# Patient Record
Sex: FEMALE | Race: WHITE | Employment: OTHER | URBAN - METROPOLITAN AREA
[De-identification: names, ages, dates, MRNs, and addresses within clinical notes are randomized per-mention and may not be internally consistent; named-entity substitution may affect disease eponyms.]

---

## 2019-09-25 ENCOUNTER — HOSPITAL ENCOUNTER (OUTPATIENT)
Age: 64
Setting detail: OBSERVATION
Discharge: HOME | End: 2019-09-28
Attending: EMERGENCY MEDICINE | Admitting: INTERNAL MEDICINE
Payer: MEDICARE

## 2019-09-25 ENCOUNTER — APPOINTMENT (OUTPATIENT)
Dept: GENERAL RADIOLOGY | Age: 64
End: 2019-09-25
Attending: EMERGENCY MEDICINE
Payer: MEDICARE

## 2019-09-25 DIAGNOSIS — I48.91 NEW ONSET ATRIAL FIBRILLATION (HCC): Primary | ICD-10-CM

## 2019-09-25 DIAGNOSIS — I48.91 ATRIAL FIBRILLATION WITH RVR (HCC): ICD-10-CM

## 2019-09-25 DIAGNOSIS — R07.9 CHEST PAIN OF UNCERTAIN ETIOLOGY: ICD-10-CM

## 2019-09-25 PROBLEM — I10 HTN (HYPERTENSION): Status: ACTIVE | Noted: 2019-09-25

## 2019-09-25 PROBLEM — R10.11 RIGHT UPPER QUADRANT PAIN: Status: ACTIVE | Noted: 2019-09-25

## 2019-09-25 PROBLEM — I48.92 ATRIAL FLUTTER WITH RAPID VENTRICULAR RESPONSE (HCC): Status: ACTIVE | Noted: 2019-09-25

## 2019-09-25 PROBLEM — R13.10 DYSPHAGIA: Status: ACTIVE | Noted: 2019-09-25

## 2019-09-25 LAB
ALBUMIN SERPL-MCNC: 4 G/DL (ref 3.2–4.6)
ALBUMIN/GLOB SERPL: 1.1 {RATIO} (ref 1.2–3.5)
ALP SERPL-CCNC: 93 U/L (ref 50–136)
ALT SERPL-CCNC: 27 U/L (ref 12–65)
AMYLASE SERPL-CCNC: 57 U/L (ref 25–115)
ANION GAP SERPL CALC-SCNC: 8 MMOL/L (ref 7–16)
APTT PPP: 27.3 SEC (ref 24.7–39.8)
AST SERPL-CCNC: 23 U/L (ref 15–37)
ATRIAL RATE: 277 BPM
BASOPHILS # BLD: 0.1 K/UL (ref 0–0.2)
BASOPHILS NFR BLD: 1 % (ref 0–2)
BILIRUB SERPL-MCNC: 0.4 MG/DL (ref 0.2–1.1)
BNP SERPL-MCNC: 199 PG/ML
BUN SERPL-MCNC: 24 MG/DL (ref 8–23)
CALCIUM SERPL-MCNC: 9.3 MG/DL (ref 8.3–10.4)
CALCULATED R AXIS, ECG10: 40 DEGREES
CALCULATED T AXIS, ECG11: 86 DEGREES
CHLORIDE SERPL-SCNC: 105 MMOL/L (ref 98–107)
CO2 SERPL-SCNC: 29 MMOL/L (ref 21–32)
CREAT SERPL-MCNC: 1.12 MG/DL (ref 0.6–1)
DIAGNOSIS, 93000: NORMAL
DIFFERENTIAL METHOD BLD: ABNORMAL
EOSINOPHIL # BLD: 0.1 K/UL (ref 0–0.8)
EOSINOPHIL NFR BLD: 1 % (ref 0.5–7.8)
ERYTHROCYTE [DISTWIDTH] IN BLOOD BY AUTOMATED COUNT: 12.8 % (ref 11.9–14.6)
GLOBULIN SER CALC-MCNC: 3.8 G/DL (ref 2.3–3.5)
GLUCOSE SERPL-MCNC: 129 MG/DL (ref 65–100)
HCT VFR BLD AUTO: 44.5 % (ref 35.8–46.3)
HGB BLD-MCNC: 14.7 G/DL (ref 11.7–15.4)
IMM GRANULOCYTES # BLD AUTO: 0.1 K/UL (ref 0–0.5)
IMM GRANULOCYTES NFR BLD AUTO: 0 % (ref 0–5)
LIPASE SERPL-CCNC: 117 U/L (ref 73–393)
LYMPHOCYTES # BLD: 4.7 K/UL (ref 0.5–4.6)
LYMPHOCYTES NFR BLD: 42 % (ref 13–44)
MAGNESIUM SERPL-MCNC: 2.2 MG/DL (ref 1.8–2.4)
MCH RBC QN AUTO: 30.5 PG (ref 26.1–32.9)
MCHC RBC AUTO-ENTMCNC: 33 G/DL (ref 31.4–35)
MCV RBC AUTO: 92.3 FL (ref 79.6–97.8)
MONOCYTES # BLD: 0.9 K/UL (ref 0.1–1.3)
MONOCYTES NFR BLD: 8 % (ref 4–12)
NEUTS SEG # BLD: 5.3 K/UL (ref 1.7–8.2)
NEUTS SEG NFR BLD: 47 % (ref 43–78)
NRBC # BLD: 0 K/UL (ref 0–0.2)
PLATELET # BLD AUTO: 279 K/UL (ref 150–450)
PMV BLD AUTO: 9.4 FL (ref 9.4–12.3)
POTASSIUM SERPL-SCNC: 4.3 MMOL/L (ref 3.5–5.1)
PROT SERPL-MCNC: 7.8 G/DL (ref 6.3–8.2)
Q-T INTERVAL, ECG07: 290 MS
QRS DURATION, ECG06: 64 MS
QTC CALCULATION (BEZET), ECG08: 379 MS
RBC # BLD AUTO: 4.82 M/UL (ref 4.05–5.2)
SODIUM SERPL-SCNC: 142 MMOL/L (ref 136–145)
TROPONIN I BLD-MCNC: 0.01 NG/ML (ref 0.02–0.05)
TROPONIN I SERPL-MCNC: 0.02 NG/ML (ref 0.02–0.05)
TROPONIN I SERPL-MCNC: <0.02 NG/ML (ref 0.02–0.05)
TSH SERPL DL<=0.005 MIU/L-ACNC: 2.92 UIU/ML (ref 0.36–3.74)
VENTRICULAR RATE, ECG03: 103 BPM
WBC # BLD AUTO: 11.2 K/UL (ref 4.3–11.1)

## 2019-09-25 PROCEDURE — 71045 X-RAY EXAM CHEST 1 VIEW: CPT

## 2019-09-25 PROCEDURE — 80053 COMPREHEN METABOLIC PANEL: CPT

## 2019-09-25 PROCEDURE — 74011250636 HC RX REV CODE- 250/636: Performed by: NURSE PRACTITIONER

## 2019-09-25 PROCEDURE — 84443 ASSAY THYROID STIM HORMONE: CPT

## 2019-09-25 PROCEDURE — 96368 THER/DIAG CONCURRENT INF: CPT | Performed by: EMERGENCY MEDICINE

## 2019-09-25 PROCEDURE — 74011000250 HC RX REV CODE- 250: Performed by: EMERGENCY MEDICINE

## 2019-09-25 PROCEDURE — 74011000258 HC RX REV CODE- 258: Performed by: INTERNAL MEDICINE

## 2019-09-25 PROCEDURE — 36415 COLL VENOUS BLD VENIPUNCTURE: CPT

## 2019-09-25 PROCEDURE — 96366 THER/PROPH/DIAG IV INF ADDON: CPT

## 2019-09-25 PROCEDURE — 84484 ASSAY OF TROPONIN QUANT: CPT

## 2019-09-25 PROCEDURE — 99285 EMERGENCY DEPT VISIT HI MDM: CPT | Performed by: EMERGENCY MEDICINE

## 2019-09-25 PROCEDURE — 74011000250 HC RX REV CODE- 250: Performed by: INTERNAL MEDICINE

## 2019-09-25 PROCEDURE — 83735 ASSAY OF MAGNESIUM: CPT

## 2019-09-25 PROCEDURE — 74011250637 HC RX REV CODE- 250/637: Performed by: EMERGENCY MEDICINE

## 2019-09-25 PROCEDURE — 96367 TX/PROPH/DG ADDL SEQ IV INF: CPT | Performed by: EMERGENCY MEDICINE

## 2019-09-25 PROCEDURE — 85025 COMPLETE CBC W/AUTO DIFF WBC: CPT

## 2019-09-25 PROCEDURE — 96375 TX/PRO/DX INJ NEW DRUG ADDON: CPT | Performed by: EMERGENCY MEDICINE

## 2019-09-25 PROCEDURE — 74011250637 HC RX REV CODE- 250/637: Performed by: NURSE PRACTITIONER

## 2019-09-25 PROCEDURE — 83690 ASSAY OF LIPASE: CPT

## 2019-09-25 PROCEDURE — 83880 ASSAY OF NATRIURETIC PEPTIDE: CPT

## 2019-09-25 PROCEDURE — 74011250636 HC RX REV CODE- 250/636: Performed by: EMERGENCY MEDICINE

## 2019-09-25 PROCEDURE — 93005 ELECTROCARDIOGRAM TRACING: CPT | Performed by: EMERGENCY MEDICINE

## 2019-09-25 PROCEDURE — 85730 THROMBOPLASTIN TIME PARTIAL: CPT

## 2019-09-25 PROCEDURE — 99218 HC RM OBSERVATION: CPT

## 2019-09-25 PROCEDURE — 96365 THER/PROPH/DIAG IV INF INIT: CPT | Performed by: EMERGENCY MEDICINE

## 2019-09-25 PROCEDURE — 96366 THER/PROPH/DIAG IV INF ADDON: CPT | Performed by: EMERGENCY MEDICINE

## 2019-09-25 PROCEDURE — 82150 ASSAY OF AMYLASE: CPT

## 2019-09-25 RX ORDER — OXYCODONE AND ACETAMINOPHEN 10; 325 MG/1; MG/1
1 TABLET ORAL
Status: DISCONTINUED | OUTPATIENT
Start: 2019-09-25 | End: 2019-09-28 | Stop reason: HOSPADM

## 2019-09-25 RX ORDER — SIMVASTATIN 10 MG/1
10 TABLET, FILM COATED ORAL
Status: DISCONTINUED | OUTPATIENT
Start: 2019-09-25 | End: 2019-09-25

## 2019-09-25 RX ORDER — DILTIAZEM HYDROCHLORIDE 5 MG/ML
10 INJECTION INTRAVENOUS ONCE
Status: DISCONTINUED | OUTPATIENT
Start: 2019-09-25 | End: 2019-09-25

## 2019-09-25 RX ORDER — PANTOPRAZOLE SODIUM 40 MG/1
40 TABLET, DELAYED RELEASE ORAL 2 TIMES DAILY
Status: DISCONTINUED | OUTPATIENT
Start: 2019-09-25 | End: 2019-09-25

## 2019-09-25 RX ORDER — SIMVASTATIN 20 MG/1
40 TABLET, FILM COATED ORAL
Status: DISCONTINUED | OUTPATIENT
Start: 2019-09-26 | End: 2019-09-26

## 2019-09-25 RX ORDER — PANTOPRAZOLE SODIUM 40 MG/1
40 TABLET, DELAYED RELEASE ORAL 2 TIMES DAILY
Status: DISCONTINUED | OUTPATIENT
Start: 2019-09-26 | End: 2019-09-28 | Stop reason: HOSPADM

## 2019-09-25 RX ORDER — MAG HYDROX/ALUMINUM HYD/SIMETH 200-200-20
30 SUSPENSION, ORAL (FINAL DOSE FORM) ORAL
Status: COMPLETED | OUTPATIENT
Start: 2019-09-25 | End: 2019-09-25

## 2019-09-25 RX ORDER — LIDOCAINE HYDROCHLORIDE 20 MG/ML
15 SOLUTION OROPHARYNGEAL
Status: COMPLETED | OUTPATIENT
Start: 2019-09-25 | End: 2019-09-25

## 2019-09-25 RX ORDER — HEPARIN SODIUM 5000 [USP'U]/100ML
12-25 INJECTION, SOLUTION INTRAVENOUS
Status: DISCONTINUED | OUTPATIENT
Start: 2019-09-25 | End: 2019-09-27

## 2019-09-25 RX ORDER — ONDANSETRON 4 MG/1
4 TABLET, FILM COATED ORAL
COMMUNITY
End: 2020-02-14

## 2019-09-25 RX ORDER — OLMESARTAN MEDOXOMIL 40 MG/1
40 TABLET ORAL DAILY
Status: DISCONTINUED | OUTPATIENT
Start: 2019-09-26 | End: 2019-09-28 | Stop reason: HOSPADM

## 2019-09-25 RX ORDER — GUAIFENESIN 100 MG/5ML
81 LIQUID (ML) ORAL DAILY
Status: DISCONTINUED | OUTPATIENT
Start: 2019-09-26 | End: 2019-09-28 | Stop reason: HOSPADM

## 2019-09-25 RX ORDER — SODIUM CHLORIDE 0.9 % (FLUSH) 0.9 %
5-40 SYRINGE (ML) INJECTION AS NEEDED
Status: DISCONTINUED | OUTPATIENT
Start: 2019-09-25 | End: 2019-09-28 | Stop reason: HOSPADM

## 2019-09-25 RX ORDER — METAXALONE 800 MG/1
800 TABLET ORAL 3 TIMES DAILY
Status: DISCONTINUED | OUTPATIENT
Start: 2019-09-25 | End: 2019-09-25

## 2019-09-25 RX ORDER — SODIUM CHLORIDE 0.9 % (FLUSH) 0.9 %
5-40 SYRINGE (ML) INJECTION EVERY 8 HOURS
Status: DISCONTINUED | OUTPATIENT
Start: 2019-09-25 | End: 2019-09-28 | Stop reason: HOSPADM

## 2019-09-25 RX ORDER — HEPARIN SODIUM 5000 [USP'U]/ML
4000 INJECTION, SOLUTION INTRAVENOUS; SUBCUTANEOUS ONCE
Status: COMPLETED | OUTPATIENT
Start: 2019-09-25 | End: 2019-09-25

## 2019-09-25 RX ORDER — TORSEMIDE 10 MG/1
TABLET ORAL AS NEEDED
COMMUNITY

## 2019-09-25 RX ORDER — MORPHINE SULFATE 2 MG/ML
2 INJECTION, SOLUTION INTRAMUSCULAR; INTRAVENOUS
Status: DISCONTINUED | OUTPATIENT
Start: 2019-09-25 | End: 2019-09-28 | Stop reason: HOSPADM

## 2019-09-25 RX ORDER — DEXLANSOPRAZOLE 60 MG/1
60 CAPSULE, DELAYED RELEASE ORAL DAILY
Status: ON HOLD | COMMUNITY
End: 2019-09-28 | Stop reason: SDUPTHER

## 2019-09-25 RX ORDER — MORPHINE SULFATE 4 MG/ML
4 INJECTION INTRAVENOUS
Status: COMPLETED | OUTPATIENT
Start: 2019-09-25 | End: 2019-09-25

## 2019-09-25 RX ORDER — MAGNESIUM SULFATE HEPTAHYDRATE 40 MG/ML
2 INJECTION, SOLUTION INTRAVENOUS
Status: COMPLETED | OUTPATIENT
Start: 2019-09-25 | End: 2019-09-25

## 2019-09-25 RX ORDER — DILTIAZEM HYDROCHLORIDE 5 MG/ML
20 INJECTION INTRAVENOUS
Status: COMPLETED | OUTPATIENT
Start: 2019-09-25 | End: 2019-09-25

## 2019-09-25 RX ORDER — NITROGLYCERIN 0.4 MG/1
0.4 TABLET SUBLINGUAL
Status: DISCONTINUED | OUTPATIENT
Start: 2019-09-25 | End: 2019-09-25 | Stop reason: SDUPTHER

## 2019-09-25 RX ORDER — NITROGLYCERIN 0.4 MG/1
0.4 TABLET SUBLINGUAL
Status: DISCONTINUED | OUTPATIENT
Start: 2019-09-25 | End: 2019-09-28 | Stop reason: HOSPADM

## 2019-09-25 RX ADMIN — DILTIAZEM HYDROCHLORIDE 10 MG/HR: 5 INJECTION, SOLUTION INTRAVENOUS at 19:19

## 2019-09-25 RX ADMIN — LIDOCAINE HYDROCHLORIDE 15 ML: 20 SOLUTION ORAL; TOPICAL at 18:02

## 2019-09-25 RX ADMIN — ALUMINUM HYDROXIDE, MAGNESIUM HYDROXIDE, AND SIMETHICONE 30 ML: 200; 200; 20 SUSPENSION ORAL at 18:02

## 2019-09-25 RX ADMIN — MORPHINE SULFATE 4 MG: 4 INJECTION INTRAVENOUS at 17:43

## 2019-09-25 RX ADMIN — HEPARIN SODIUM 12 UNITS/KG/HR: 5000 INJECTION, SOLUTION INTRAVENOUS at 21:30

## 2019-09-25 RX ADMIN — MAGNESIUM SULFATE HEPTAHYDRATE 2 G: 40 INJECTION, SOLUTION INTRAVENOUS at 15:49

## 2019-09-25 RX ADMIN — SODIUM CHLORIDE 1000 ML: 900 INJECTION, SOLUTION INTRAVENOUS at 15:49

## 2019-09-25 RX ADMIN — NITROGLYCERIN 1 INCH: 20 OINTMENT TOPICAL at 22:47

## 2019-09-25 RX ADMIN — DILTIAZEM HYDROCHLORIDE 20 MG: 5 INJECTION INTRAVENOUS at 15:14

## 2019-09-25 RX ADMIN — OXYCODONE HYDROCHLORIDE AND ACETAMINOPHEN 1 TABLET: 10; 325 TABLET ORAL at 22:44

## 2019-09-25 RX ADMIN — HEPARIN SODIUM 4000 UNITS: 5000 INJECTION INTRAVENOUS; SUBCUTANEOUS at 21:13

## 2019-09-25 NOTE — ED PROVIDER NOTES
[facility] Emergency Department  Arrival Date/Time: No admission date for patient encounter. Nessa Burns  MRN: [id number]    YOB: 1955   59 y.o. female    [facility] EMERGENCY DEPT Room/bed info not found  Seen on 9/25/2019 @ 2:50 PM      Today's Chief Complaint: No chief complaint on file. TRIAGE Provider NOTE:  Pt with multiple c/o. R sided upper abd pain, into epigastric region, across back. RUQ pain for weeks, back and chest pain, for days. Tylenol and advil not helping. Has GI follow up for endoscopy/colonoscopy on Friday, referred to cards, cards told to come here. Feels like food and meds get stuck. C/o nausea, shob. No abd surgeries. Quit smoking now using e-cig. Hx of 2 stents. No NTG taken. No hx of afib. Afib with . No ST changes. Franko Montalvo MD; 9/25/2019 @2:50 PM============================     Nessa Burns is a 59 y.o. female seen on 9/25/2019 at 2:50 PM in the [facility] EMERGENCY DEPT   HPI:    HPI    Review of Systems: Review of Systems    Past Medical History: Primary Care Doctor: Denise Nash, Not On File  Meds, PMH, PSHx, SocHx at end of this note     Allergies: Allergies   Allergen Reactions    Erythromycin Nausea and Vomiting    Sulfa (Sulfonamide Antibiotics) Nausea and Vomiting         Key Anti-Platelet Anticoagulant Meds             aspirin (ASPIRIN) 325 mg tablet Take 325 mg by mouth daily. Physical Exam:  Nursing documentation reviewed. There were no vitals filed for this visit. Vital signs were reviewed. Physical Exam    MEDICAL DECISION MAKING:   Differential Diagnosis:    MDM      Data:      Lab findings during this visit: No results found for this or any previous visit (from the past 24 hour(s)).      Radiology studies during this visit:   No orders to display        Medications given in the ED: Medications - No data to display    Procedure Documentation: Procedures     Assessment and Plan:      Other ED Course Notes:        Past Medical History:      Past Medical History:   Diagnosis Date    Arthritis     CAD (coronary artery disease)     Cancer ([facility]     skin    GERD (gastroesophageal reflux disease)     Hypertension     Nausea & vomiting     Other ill-defined conditions(799.89)     high cholesterol; brain lesions, back pain    Stroke ([facility]     ? TIA     Past Surgical History:   Procedure Laterality Date    CARDIAC SURG PROCEDURE UNLIST      CATH - STENTS x2    HX ORTHOPAEDIC      knee surg     Social History     Tobacco Use    Smoking status: Current Every Day Smoker     Packs/day: 1.50    Tobacco comment: ecig only   Substance Use Topics    Alcohol use: No    Drug use: No     Home Medication:   Cannot display prior to admission medications because the patient has not been admitted in this contact.

## 2019-09-25 NOTE — ED NOTES
Jose IbarraValero St Altagracia Barrera is a 59 y.o. female seen on 9/25/2019 at 3:15 PM in the [facility] EMERGENCY DEPT in room ER10/10. Chief Complaint   Patient presents with    Chest Pain     HPI: 60-year-old female presenting to the emergency department for evaluation of chest pain. She states she has been having chest pain intermittently over the last 2 months. The pain is located in the epigastrium and radiating up into her the right side of her chest.  She is had epigastric pain for quite some time it will occasionally radiate into her chest.  Today however it began to radiate up into her chest and also she was feeling like she was having difficulty swallowing like things she was eating or hanging up high in her throat. She is scheduled for endoscopy and colonoscopy on Friday for evaluation of possible esophagitis and gastritis. She also recently had a right upper quadrant ultrasound to evaluate the symptoms further. She also feels like her heart is racing and it is beating irregularly. This is been going on intermittently over a couple of days but it seems more intense and persistent for the last couple of hours today. She is had some lightheadedness with standing. She denies diaphoresis, no nausea vomiting. She notes that her symptoms are worse when she is smoking electronic cigarettes. She has a history of myocardial infarction in the past.  At that time she had epigastric pain and right-sided chest pain as well but that that time she also states the pain settled in her right jaw and gumline and is well as pain in her right ear. Historian: Patient    REVIEW OF SYSTEMS     Review of Systems   Constitutional: Negative for fever. HENT: Negative. Eyes: Negative. Respiratory: Negative for cough, chest tightness, shortness of breath and wheezing. Cardiovascular: Positive for chest pain and palpitations.    Gastrointestinal: Negative for abdominal distention, abdominal pain, constipation, diarrhea and vomiting. Endocrine: Negative. Genitourinary: Negative for dysuria, flank pain, frequency and urgency. Neurological: Negative for dizziness, syncope and headaches. Psychiatric/Behavioral: Negative. All other systems reviewed and are negative. PAST MEDICAL HISTORY     Past Medical History:   Diagnosis Date    Arthritis     CAD (coronary artery disease)     Cancer ([facility]     skin    GERD (gastroesophageal reflux disease)     Hypertension     Nausea & vomiting     Other ill-defined conditions(799.89)     high cholesterol; brain lesions, back pain    Stroke ([facility]     ? TIA     Past Surgical History:   Procedure Laterality Date    CARDIAC SURG PROCEDURE UNLIST      CATH - STENTS x2    HX ORTHOPAEDIC      knee surg     Social History     Socioeconomic History    Marital status:      Spouse name: Not on file    Number of children: Not on file    Years of education: Not on file    Highest education level: Not on file   Tobacco Use    Smoking status: Current Every Day Smoker     Packs/day: 1.50    Tobacco comment: ecig only   Substance and Sexual Activity    Alcohol use: No    Drug use: No     Prior to Admission Medications   Prescriptions Last Dose Informant Patient Reported? Taking? Alpha Lipoic Acid 600 mg cap   Yes No   Sig: Take  by mouth daily. ascorbic acid (VITAMIN C) 500 mg tablet   Yes No   Sig: Take 500 mg by mouth daily. aspirin (ASPIRIN) 325 mg tablet   Yes No   Sig: Take 325 mg by mouth daily. bromelains 500 mg tab   Yes No   Sig: Take  by mouth daily. co-enzyme Q-10 (CO Q-10) 100 mg capsule   Yes No   Sig: Take 100 mg by mouth daily. hydroCHLOROthiazide (MICROZIDE) 12.5 mg capsule   Yes No   Sig: Take 12.5 mg by mouth daily. loratadine (CLARITIN) 10 mg tablet   Yes No   Sig: Take 10 mg by mouth daily. meloxicam (MOBIC) 7.5 mg tablet   Yes No   Sig: Take  by mouth daily.    metaxalone (SKELAXIN) 800 mg tablet   Yes No   Sig: Take  by mouth as needed. milk thistle 500 mg cap   Yes No   Sig: Take  by mouth daily. nitroglycerin (NITROSTAT) 0.4 mg SL tablet   No No   Si Tab by SubLINGual route every five (5) minutes as needed for Chest Pain. olmesartan (BENICAR) 40 mg tablet   Yes No   Sig: Take 40 mg by mouth daily. oxyCODONE (OXYIR) 5 mg capsule   Yes No   Sig: Take 10 mg by mouth every four (4) hours as needed. usu takes abt twice a day   pantoprazole (PROTONIX) 40 mg tablet   Yes No   Sig: Take 40 mg by mouth two (2) times a day. simvastatin (ZOCOR) 10 mg tablet   Yes No   Sig: Take 10 mg by mouth nightly. Facility-Administered Medications: None     Allergies   Allergen Reactions    Erythromycin Nausea and Vomiting    Sulfa (Sulfonamide Antibiotics) Nausea and Vomiting        PHYSICAL EXAM       Vitals:    19 1454 19 1514   BP: 114/58 158/72   Pulse: (!) 139 (!) 154   Resp: 16    Temp: 97.4 °F (36.3 °C)    SpO2: 96%     Vital signs were reviewed. Physical Exam   Constitutional: She is oriented to person, place, and time. She appears well-developed and well-nourished. No distress. HENT:   Head: Normocephalic and atraumatic. Eyes: Pupils are equal, round, and reactive to light. EOM are normal.   Neck: Normal range of motion. Neck supple. Cardiovascular: Normal heart sounds and intact distal pulses. An irregularly irregular rhythm present. Tachycardia present. Exam reveals no gallop and no friction rub. No murmur heard. Pulmonary/Chest: Effort normal and breath sounds normal. No stridor. No respiratory distress. She has no wheezes. Abdominal: Soft. Bowel sounds are normal. She exhibits no distension and no mass. There is tenderness in the epigastric area. There is no rigidity, no rebound, no guarding and no CVA tenderness. Musculoskeletal: Normal range of motion. She exhibits no edema, tenderness or deformity. Neurological: She is alert and oriented to person, place, and time. No sensory deficit. No focal neuro deficits   Skin: Skin is warm and dry. No rash noted. She is not diaphoretic. No erythema. Psychiatric: She has a normal mood and affect. Her behavior is normal.   Vitals reviewed. MEDICAL DECISION MAKING     MDM  Number of Diagnoses or Management Options     Amount and/or Complexity of Data Reviewed  Clinical lab tests: ordered and reviewed  Tests in the radiology section of CPT®: ordered and reviewed  Review and summarize past medical records: yes (Last Echo 4/2019: EF 55%)    Risk of Complications, Morbidity, and/or Mortality  Presenting problems: high  Diagnostic procedures: high  Management options: high      CRITICAL CARE (ASAP ONLY)  Performed by: Ignacia Mendiola MD  Authorized by: Ignacia Mendiola MD     Critical care provider statement:     Critical care time (minutes):  72    Critical care was necessary to treat or prevent imminent or life-threatening deterioration of the following conditions:  Cardiac failure and circulatory failure    Critical care was time spent personally by me on the following activities:  Blood draw for specimens, discussions with consultants, development of treatment plan with patient or surrogate, evaluation of patient's response to treatment, re-evaluation of patient's condition, ordering and performing treatments and interventions, ordering and review of laboratory studies, ordering and review of radiographic studies, pulse oximetry, review of old charts and examination of patient        ED Course:  Pt a fib with rvr, rate in 150s. Will give diltiazem 20mg IV.    4:03 PM  Rate has improved to 90s-100s with diltiazem. Pt receiving fluid and magnesium. 5:55 PM  Patient continuing to have chest discomfort despite negative troponin nonischemic EKG. She remains rate controlled, but in atrial fibrillation. I discussed the case with cardiology who will see the patient here in the emergency department.     6:31 PM  Seen by cardiology who will admit for further treatment and care. Disposition: Admission to cardiology service  Diagnosis: Chest pain uncertain etiology, new onset atrial fibrillation with rapid ventricular rate  ____________________________________________________________________  A portion of this note was generated using voice recognition dictation software. While the note has been reviewed for accuracy, please note certain words and phrases may not be transcribed as intended and some grammatical and/or typographical errors may be present.

## 2019-09-25 NOTE — H&P
[address] Cardiology H&P    Admitting Cardiologist:Dr. Tiffanie Norman     Primary Cardiologist:Dr. Carbajal     Primary Care Physician:    Subjective:     Kareem Molina is a 59 y.o. female with hx of HTN, LAD/ RCA stents in 2014 with patent stents by cath in 2016. She has been having significant problems with swallowing for last several weeks and is scheduled for EGD on Friday but developed chest pain describes it as a soreness across chest that is constant radiating to right chest and right jaw. CP started today when she felt her heart racing. She is noted to be in afib with RVR in ER and was given IV cardizem with improved rate control. She also describes epigastric and RUQ pain that has been persistent for several days. She is on chronic narcotics for back pain and frequently has constipation. Given GI cocktail in ER without much improvement of epigastric and RUQ discomfort. Pt also relates no prior CVA but with \"multiple brain lesions seen on scan several years ago \". Has been using E cigs multiple times a day. Past Medical History:   Diagnosis Date    A-fib [facility]) 9/25/2019    Arthritis     CAD (coronary artery disease)     Cancer (HCC)     skin    GERD (gastroesophageal reflux disease)     Hypertension     Nausea & vomiting     Other ill-defined conditions(229.89)     high cholesterol; brain lesions, back pain    Stroke [facility])     ? TIA      Past Surgical History:   Procedure Laterality Date    CARDIAC SURG PROCEDURE UNLIST      CATH - STENTS x2    HX ORTHOPAEDIC      knee surg      No current facility-administered medications for this encounter. Current Outpatient Medications   Medication Sig    aspirin (ASPIRIN) 325 mg tablet Take 325 mg by mouth daily.  olmesartan (BENICAR) 40 mg tablet Take 40 mg by mouth daily.  nitroglycerin (NITROSTAT) 0.4 mg SL tablet 1 Tab by SubLINGual route every five (5) minutes as needed for Chest Pain.     loratadine (CLARITIN) 10 mg tablet Take 10 mg by mouth daily.    hydroCHLOROthiazide (MICROZIDE) 12.5 mg capsule Take 12.5 mg by mouth daily.  Alpha Lipoic Acid 600 mg cap Take  by mouth daily.  bromelains 500 mg tab Take  by mouth daily.  milk thistle 500 mg cap Take  by mouth daily.  co-enzyme Q-10 (CO Q-10) 100 mg capsule Take 100 mg by mouth daily.  metaxalone (SKELAXIN) 800 mg tablet Take  by mouth as needed.  meloxicam (MOBIC) 7.5 mg tablet Take  by mouth daily.  ascorbic acid (VITAMIN C) 500 mg tablet Take 500 mg by mouth daily.  oxyCODONE (OXYIR) 5 mg capsule Take 10 mg by mouth every four (4) hours as needed. usu takes abt twice a day    simvastatin (ZOCOR) 10 mg tablet Take 10 mg by mouth nightly.  pantoprazole (PROTONIX) 40 mg tablet Take 40 mg by mouth two (2) times a day. Allergies   Allergen Reactions    Erythromycin Nausea and Vomiting    Sulfa (Sulfonamide Antibiotics) Nausea and Vomiting      Social History     Tobacco Use    Smoking status: Current Every Day Smoker     Packs/day: 1.50    Tobacco comment: ecig only   Substance Use Topics    Alcohol use: No      Family History   Problem Relation Age of Onset    Heart Disease Mother         Review of Systems  Gen: Denies fever, chills, malaise or fatigue. Appetite good. HEENT: Denies frequent headaches, dizzyness, visual disturbances, Neck pain or swallowing difficulty  Lungs: Denies shortness of breath, hx of COPD, breathing problems  Cardiovascular: as above   GI: as above   : Denies dysuria, no complaints of frequency, nocturia  Heme: No prior bleeding disorders, no prior Cancer  Neuro: Denies prior CVA, TIA. Endocrine: no diabetes, thyroid disorders  Psychiatric: Denies anxiety, or other psychiatric illnesses.      Objective:     Visit Vitals  /67   Pulse 97   Temp 97.4 °F (36.3 °C)   Resp 16   Ht 5' 2\" (1.575 m)   Wt 70.8 kg (156 lb)   SpO2 93%   BMI 28.53 kg/m²     General:Alert, cooperative, no distress, appears stated age  Head: Normocephalic, without obvious abnormality, atraumatic. Eyes: Conjunctivae/corneas clear. PERRL, EOMs intact  Nose:Nares normal. Septum midline. Mucosa normal. No drainage or sinus tenderness. Throat: Lips, mucosa, and tongue normal. Teeth and gums normal.   Neck: Supple, symmetrical, trachea midline,  no carotid bruit and no JVD. Lungs:Clear to auscultation bilaterally. Chest wall: No tenderness or deformity. Heart: irregular, irregular   Abdomen+ RUQ discomfort   Extremities: Extremities normal, atraumatic, no cyanosis or edema. Pulses: 2+ and symmetric all extremities. Skin: Skin color, texture, turgor normal. No rashes or lesions  Lymph nodes: Cervical, supraclavicular, and axillary nodes normal  Neurologic:No focal deficits identified                 ECG: afib with RVR     Data Review:     Recent Results (from the past 24 hour(s))   CBC WITH AUTOMATED DIFF    Collection Time: 09/25/19  3:00 PM   Result Value Ref Range    WBC 11.2 (H) 4.3 - 11.1 K/uL    RBC 4.82 4.05 - 5.2 M/uL    HGB 14.7 11.7 - 15.4 g/dL    HCT 44.5 35.8 - 46.3 %    MCV 92.3 79.6 - 97.8 FL    MCH 30.5 26.1 - 32.9 PG    MCHC 33.0 31.4 - 35.0 g/dL    RDW 12.8 11.9 - 14.6 %    PLATELET 255 617 - 519 K/uL    MPV 9.4 9.4 - 12.3 FL    ABSOLUTE NRBC 0.00 0.0 - 0.2 K/uL    DF AUTOMATED      NEUTROPHILS 47 43 - 78 %    LYMPHOCYTES 42 13 - 44 %    MONOCYTES 8 4.0 - 12.0 %    EOSINOPHILS 1 0.5 - 7.8 %    BASOPHILS 1 0.0 - 2.0 %    IMMATURE GRANULOCYTES 0 0.0 - 5.0 %    ABS. NEUTROPHILS 5.3 1.7 - 8.2 K/UL    ABS. LYMPHOCYTES 4.7 (H) 0.5 - 4.6 K/UL    ABS. MONOCYTES 0.9 0.1 - 1.3 K/UL    ABS. EOSINOPHILS 0.1 0.0 - 0.8 K/UL    ABS. BASOPHILS 0.1 0.0 - 0.2 K/UL    ABS. IMM.  GRANS. 0.1 0.0 - 0.5 K/UL   METABOLIC PANEL, COMPREHENSIVE    Collection Time: 09/25/19  3:00 PM   Result Value Ref Range    Sodium 142 136 - 145 mmol/L    Potassium 4.3 3.5 - 5.1 mmol/L    Chloride 105 98 - 107 mmol/L    CO2 29 21 - 32 mmol/L    Anion gap 8 7 - 16 mmol/L    Glucose 129 (H) 65 - 100 mg/dL    BUN 24 (H) 8 - 23 MG/DL    Creatinine 1.12 (H) 0.6 - 1.0 MG/DL    GFR est AA >60 >60 ml/min/1.73m2    GFR est non-AA 52 (L) >60 ml/min/1.73m2    Calcium 9.3 8.3 - 10.4 MG/DL    Bilirubin, total 0.4 0.2 - 1.1 MG/DL    ALT (SGPT) 27 12 - 65 U/L    AST (SGOT) 23 15 - 37 U/L    Alk. phosphatase 93 50 - 136 U/L    Protein, total 7.8 6.3 - 8.2 g/dL    Albumin 4.0 3.2 - 4.6 g/dL    Globulin 3.8 (H) 2.3 - 3.5 g/dL    A-G Ratio 1.1 (L) 1.2 - 3.5     PTT    Collection Time: 09/25/19  3:00 PM   Result Value Ref Range    aPTT 27.3 24.7 - 39.8 SEC   MAGNESIUM    Collection Time: 09/25/19  3:00 PM   Result Value Ref Range    Magnesium 2.2 1.8 - 2.4 mg/dL   TROPONIN I    Collection Time: 09/25/19  3:00 PM   Result Value Ref Range    Troponin-I, Qt. 0.02 0.02 - 0.05 NG/ML   BNP    Collection Time: 09/25/19  3:00 PM   Result Value Ref Range     (H) 0 pg/mL   EKG, 12 LEAD, SUBSEQUENT    Collection Time: 09/25/19  3:46 PM   Result Value Ref Range    Ventricular Rate 103 BPM    Atrial Rate 277 BPM    QRS Duration 64 ms    Q-T Interval 290 ms    QTC Calculation (Bezet) 379 ms    Calculated R Axis 40 degrees    Calculated T Axis 86 degrees    Diagnosis       !! AGE AND GENDER SPECIFIC ECG ANALYSIS !! Atrial fibrillation with rapid ventricular response  Low voltage QRS  Cannot rule out Anterior infarct , age undetermined  Abnormal ECG  When compared with ECG of 23-FEB-2015 11:43,  Atrial fibrillation has replaced Sinus rhythm  Nonspecific T wave abnormality now evident in Inferior leads  Nonspecific T wave abnormality now evident in Anterior leads  QT has shortened     POC TROPONIN-I    Collection Time: 09/25/19  6:09 PM   Result Value Ref Range    Troponin-I (POC) 0.01 (L) 0.02 - 0.05 ng/ml         Assessment / Plan     Principal Problem:    A-fib (HCC) (9/25/2019)--with RVR,. Admit to telemetry, Add IV heparin for CVA prevention, IV cardizem infusion for rate control.  Check TSH    Active Problems: Dyslipidemia (4/2/2014)--statin       Tobacco use disorder (4/2/2014)--stopped smoking but uses e cigs       Chronic lower back pain (2/23/2015)--with chronic narcotic use and frequent constipation       Coronary atherosclerosis of native coronary vessel (2/24/2015)--CP sounds atypical but will cycle additional enzymes. Overview: 1. NSTEMI (3/31/14): Peak troponin 3.6      a. Multivessel PCI:   mRCA:  Xience 3.5 x 33 mm JEREMY. mLAD:  Xience 2.75       x 18 mm JEREMY      2. LHC (2/24/15):  EF 60%. Patent stents. Mild disease in other       segments. HTN (hypertension) (9/25/2019)--continue home meds, titrate as needed. Dysphagia (9/25/2019)--will keep NPO overnight, ask GI to see in AM, and may need EGD tomorrow. Right upper quadrant pain (9/25/2019)--will check amylase, lipase, defer to GI in am. Normal WBC, no fever.                Rey Crate, NP

## 2019-09-25 NOTE — ED TRIAGE NOTES
Pt reports upper abd/chest pain for weeks. Pt states pain has started to recently go across upper back and into left breast. Pt supposed to have endoscopy and colonoscopy. Denies any abd surgeries. Pt had a heart attack 5 years, had 2 stents placed. Pt has also had nausea.

## 2019-09-26 ENCOUNTER — ANESTHESIA (OUTPATIENT)
Dept: ENDOSCOPY | Age: 64
End: 2019-09-26
Payer: MEDICARE

## 2019-09-26 ENCOUNTER — ANESTHESIA EVENT (OUTPATIENT)
Dept: ENDOSCOPY | Age: 64
End: 2019-09-26
Payer: MEDICARE

## 2019-09-26 LAB
ANION GAP SERPL CALC-SCNC: 6 MMOL/L (ref 7–16)
APTT PPP: 42.8 SEC (ref 24.7–39.8)
APTT PPP: 57.7 SEC (ref 24.7–39.8)
APTT PPP: 84.1 SEC (ref 24.7–39.8)
BUN SERPL-MCNC: 21 MG/DL (ref 8–23)
CALCIUM SERPL-MCNC: 8.4 MG/DL (ref 8.3–10.4)
CHLORIDE SERPL-SCNC: 107 MMOL/L (ref 98–107)
CHOLEST SERPL-MCNC: 180 MG/DL
CO2 SERPL-SCNC: 28 MMOL/L (ref 21–32)
CREAT SERPL-MCNC: 0.87 MG/DL (ref 0.6–1)
ERYTHROCYTE [DISTWIDTH] IN BLOOD BY AUTOMATED COUNT: 12.9 % (ref 11.9–14.6)
GLUCOSE SERPL-MCNC: 119 MG/DL (ref 65–100)
HCT VFR BLD AUTO: 38.6 % (ref 35.8–46.3)
HDLC SERPL-MCNC: 40 MG/DL (ref 40–60)
HDLC SERPL: 4.5 {RATIO}
HGB BLD-MCNC: 12.6 G/DL (ref 11.7–15.4)
LDLC SERPL CALC-MCNC: ABNORMAL MG/DL
LDLC SERPL DIRECT ASSAY-MCNC: 90 MG/DL
LIPID PROFILE,FLP: ABNORMAL
MCH RBC QN AUTO: 30.4 PG (ref 26.1–32.9)
MCHC RBC AUTO-ENTMCNC: 32.6 G/DL (ref 31.4–35)
MCV RBC AUTO: 93.2 FL (ref 79.6–97.8)
NRBC # BLD: 0 K/UL (ref 0–0.2)
PLATELET # BLD AUTO: 227 K/UL (ref 150–450)
PMV BLD AUTO: 9.1 FL (ref 9.4–12.3)
POTASSIUM SERPL-SCNC: 3.7 MMOL/L (ref 3.5–5.1)
RBC # BLD AUTO: 4.14 M/UL (ref 4.05–5.2)
SODIUM SERPL-SCNC: 141 MMOL/L (ref 136–145)
TRIGL SERPL-MCNC: 522 MG/DL (ref 35–150)
TROPONIN I SERPL-MCNC: <0.02 NG/ML (ref 0.02–0.05)
VLDLC SERPL CALC-MCNC: 104.4 MG/DL (ref 6–23)
WBC # BLD AUTO: 10 K/UL (ref 4.3–11.1)

## 2019-09-26 PROCEDURE — 74011250636 HC RX REV CODE- 250/636: Performed by: NURSE PRACTITIONER

## 2019-09-26 PROCEDURE — 96376 TX/PRO/DX INJ SAME DRUG ADON: CPT

## 2019-09-26 PROCEDURE — 74011000258 HC RX REV CODE- 258

## 2019-09-26 PROCEDURE — 80061 LIPID PANEL: CPT

## 2019-09-26 PROCEDURE — 74011000250 HC RX REV CODE- 250: Performed by: INTERNAL MEDICINE

## 2019-09-26 PROCEDURE — 85730 THROMBOPLASTIN TIME PARTIAL: CPT

## 2019-09-26 PROCEDURE — 74011250636 HC RX REV CODE- 250/636: Performed by: INTERNAL MEDICINE

## 2019-09-26 PROCEDURE — 74011000258 HC RX REV CODE- 258: Performed by: INTERNAL MEDICINE

## 2019-09-26 PROCEDURE — 76040000025: Performed by: INTERNAL MEDICINE

## 2019-09-26 PROCEDURE — 36415 COLL VENOUS BLD VENIPUNCTURE: CPT

## 2019-09-26 PROCEDURE — 76060000031 HC ANESTHESIA FIRST 0.5 HR: Performed by: INTERNAL MEDICINE

## 2019-09-26 PROCEDURE — 74011250637 HC RX REV CODE- 250/637: Performed by: NURSE PRACTITIONER

## 2019-09-26 PROCEDURE — 96366 THER/PROPH/DIAG IV INF ADDON: CPT

## 2019-09-26 PROCEDURE — 74011250636 HC RX REV CODE- 250/636

## 2019-09-26 PROCEDURE — C8929 TTE W OR WO FOL WCON,DOPPLER: HCPCS

## 2019-09-26 PROCEDURE — 77030021593 HC FCPS BIOP ENDOSC BSC -A: Performed by: INTERNAL MEDICINE

## 2019-09-26 PROCEDURE — 85027 COMPLETE CBC AUTOMATED: CPT

## 2019-09-26 PROCEDURE — 99218 HC RM OBSERVATION: CPT

## 2019-09-26 PROCEDURE — 88305 TISSUE EXAM BY PATHOLOGIST: CPT

## 2019-09-26 PROCEDURE — 74011000250 HC RX REV CODE- 250

## 2019-09-26 PROCEDURE — 84484 ASSAY OF TROPONIN QUANT: CPT

## 2019-09-26 PROCEDURE — 80048 BASIC METABOLIC PNL TOTAL CA: CPT

## 2019-09-26 PROCEDURE — 83721 ASSAY OF BLOOD LIPOPROTEIN: CPT

## 2019-09-26 PROCEDURE — 74011250637 HC RX REV CODE- 250/637: Performed by: INTERNAL MEDICINE

## 2019-09-26 RX ORDER — ROSUVASTATIN CALCIUM 20 MG/1
40 TABLET, COATED ORAL
Status: DISCONTINUED | OUTPATIENT
Start: 2019-09-26 | End: 2019-09-28 | Stop reason: HOSPADM

## 2019-09-26 RX ORDER — METOPROLOL TARTRATE 5 MG/5ML
INJECTION INTRAVENOUS AS NEEDED
Status: DISCONTINUED | OUTPATIENT
Start: 2019-09-26 | End: 2019-09-26 | Stop reason: HOSPADM

## 2019-09-26 RX ORDER — SODIUM CHLORIDE, SODIUM LACTATE, POTASSIUM CHLORIDE, CALCIUM CHLORIDE 600; 310; 30; 20 MG/100ML; MG/100ML; MG/100ML; MG/100ML
1000 INJECTION, SOLUTION INTRAVENOUS CONTINUOUS
Status: DISCONTINUED | OUTPATIENT
Start: 2019-09-26 | End: 2019-09-26 | Stop reason: HOSPADM

## 2019-09-26 RX ORDER — HEPARIN SODIUM 5000 [USP'U]/ML
35 INJECTION, SOLUTION INTRAVENOUS; SUBCUTANEOUS ONCE
Status: COMPLETED | OUTPATIENT
Start: 2019-09-26 | End: 2019-09-26

## 2019-09-26 RX ORDER — PROPOFOL 10 MG/ML
INJECTION, EMULSION INTRAVENOUS AS NEEDED
Status: DISCONTINUED | OUTPATIENT
Start: 2019-09-26 | End: 2019-09-26 | Stop reason: HOSPADM

## 2019-09-26 RX ORDER — ACETAMINOPHEN 325 MG/1
650 TABLET ORAL
Status: DISCONTINUED | OUTPATIENT
Start: 2019-09-26 | End: 2019-09-28 | Stop reason: HOSPADM

## 2019-09-26 RX ADMIN — PROPOFOL 50 MG: 10 INJECTION, EMULSION INTRAVENOUS at 15:11

## 2019-09-26 RX ADMIN — PERFLUTREN 1 ML: 6.52 INJECTION, SUSPENSION INTRAVENOUS at 10:00

## 2019-09-26 RX ADMIN — HEPARIN SODIUM 18 UNITS/KG/HR: 5000 INJECTION, SOLUTION INTRAVENOUS at 22:54

## 2019-09-26 RX ADMIN — ROSUVASTATIN CALCIUM 40 MG: 20 TABLET, COATED ORAL at 21:28

## 2019-09-26 RX ADMIN — SODIUM CHLORIDE, SODIUM LACTATE, POTASSIUM CHLORIDE, AND CALCIUM CHLORIDE 1000 ML: 600; 310; 30; 20 INJECTION, SOLUTION INTRAVENOUS at 14:44

## 2019-09-26 RX ADMIN — PROPOFOL 30 MG: 10 INJECTION, EMULSION INTRAVENOUS at 15:12

## 2019-09-26 RX ADMIN — ASPIRIN 81 MG 81 MG: 81 TABLET ORAL at 08:32

## 2019-09-26 RX ADMIN — HEPARIN SODIUM 2850 UNITS: 5000 INJECTION INTRAVENOUS; SUBCUTANEOUS at 22:54

## 2019-09-26 RX ADMIN — PANTOPRAZOLE SODIUM 40 MG: 40 TABLET, DELAYED RELEASE ORAL at 08:32

## 2019-09-26 RX ADMIN — NITROGLYCERIN 1 INCH: 20 OINTMENT TOPICAL at 05:03

## 2019-09-26 RX ADMIN — OLMESARTAN MEDOXOMIL 40 MG: 40 TABLET, FILM COATED ORAL at 08:32

## 2019-09-26 RX ADMIN — PROPOFOL 40 MG: 10 INJECTION, EMULSION INTRAVENOUS at 15:14

## 2019-09-26 RX ADMIN — NITROGLYCERIN 1 INCH: 20 OINTMENT TOPICAL at 23:21

## 2019-09-26 RX ADMIN — HEPARIN SODIUM 2850 UNITS: 5000 INJECTION INTRAVENOUS; SUBCUTANEOUS at 04:20

## 2019-09-26 RX ADMIN — METOPROLOL TARTRATE 2 MG: 5 INJECTION INTRAVENOUS at 15:10

## 2019-09-26 RX ADMIN — PANTOPRAZOLE SODIUM 40 MG: 40 TABLET, DELAYED RELEASE ORAL at 17:44

## 2019-09-26 RX ADMIN — PROPOFOL 20 MG: 10 INJECTION, EMULSION INTRAVENOUS at 15:17

## 2019-09-26 RX ADMIN — Medication 10 ML: at 05:01

## 2019-09-26 RX ADMIN — DILTIAZEM HYDROCHLORIDE 5 MG/HR: 5 INJECTION, SOLUTION INTRAVENOUS at 05:01

## 2019-09-26 RX ADMIN — ACETAMINOPHEN 650 MG: 325 TABLET, FILM COATED ORAL at 05:12

## 2019-09-26 RX ADMIN — Medication 10 ML: at 21:27

## 2019-09-26 NOTE — PROGRESS NOTES
Returned call for report. Informed that Jakub Giang was currently transporting a patient to a floor and would return call when he returns.

## 2019-09-26 NOTE — PROGRESS NOTES
[facility] CARDIOLOGY PROGRESS NOTE           9/26/2019 7:47 AM    Admit Date: 9/25/2019      Subjective:   Patient notes right sided chest discomfort. No palpitations or tachycardia. In rate controlled atrial fibrillation with IV cardizem. GI consult pending. ROS:  Cardiovascular:  As noted above    Objective:      Vitals:    09/26/19 0402 09/26/19 0423 09/26/19 0448 09/26/19 0600   BP: 94/53 103/49 110/60 94/54   Pulse: 70 72 70 75   Resp:  18     Temp:  98.1 °F (36.7 °C)     SpO2:  95%     Weight:  80.8 kg (178 lb 1.6 oz)     Height:           Physical Exam:  General-No Acute Distress  Neck- supple, no JVD  CV- IRIR  Lung- clear bilaterally  Abd- soft, nontender, nondistended  Ext- no edema bilaterally. Skin- warm and dry      Data Review:   Recent Labs     09/26/19  0323 09/25/19  1500    142   K 3.7 4.3   MG  --  2.2   BUN 21 24*   CREA 0.87 1.12*   * 129*   WBC 10.0 11.2*   HGB 12.6 14.7   HCT 38.6 44.5    279   TRIGL 522*  --    LDL 90  --    HDL 40  --         Lab Results   Component Value Date/Time    TROIQ <0.02 (L) 09/26/2019 03:23 AM    TROIQ <0.02 (L) 09/25/2019 11:09 PM    TROIQ 0.02 09/25/2019 03:00 PM    TNIPOC 0.01 (L) 09/25/2019 06:09 PM       Assessment/Plan:     Principal Problem:    A-fib ([address] (9/25/2019)    New onset. Needs ALIZA/DCCV once GI evaluation complete. NPO today for hopefully EGD. On IV heparin and Cardizem. Change to NOAC post procedure. Active Problems:    Dyslipidemia (4/2/2014)    Lipids not optimal.  Stop Zocor. Start Crestor. Tobacco use disorder (4/2/2014)    Smoking cessation discussed. Coronary atherosclerosis of native coronary vessel (2/24/2015)    Right sided chest pain. Reportedly similar symptoms prior to PCI in past.  Complicated by GI symptoms. Enzymes negative. Will await GI evaluation then decide if ischemia evaluation is normal.        HTN (hypertension) (9/25/2019)    BP controlled. Dysphagia (9/25/2019)    GI consult pending. Right upper quadrant pain (9/25/2019)    GI consult pending.                       Andres Rivera MD  9/26/2019 7:47 AM

## 2019-09-26 NOTE — PROGRESS NOTES
Cardizem titrated per MAR.     09/25/19 9125   Vital Signs   Pulse (Heart Rate) 73   Heart Rate Source Monitor   Cardiac Rhythm A Fib   /56   MAP (Calculated) 71   Pain 1   Pain Scale 1 FLACC   Pain Intensity 1 0   Patient Stated Pain Goal Unable to verbalize/indicate pain   Pain Reassessment 1 Patient resting w/respiratory rate greater than 10   FLACC Pain Scale 1   Face 1 0   Legs 1 0   Activity 1 0   Cry 1 0   Consolability 1 0   FLACC Score 1 0   Patient Observation   Observations No signs of pain noted

## 2019-09-26 NOTE — PROGRESS NOTES
Bedside and Verbal shift change report given to self (oncoming nurse) by Shan Guthrie RN (offgoing nurse). Report included the following information SBAR, Kardex, Intake/Output and MAR. Heparin and cardizem verified at bedside.

## 2019-09-26 NOTE — PROGRESS NOTES
Bedside and verbal report given to [address] [place] [address] by Suzanne Licea. Report included the following information SBAR, Kardex, Intake/Output and MAR. Oncoming RN assumed care of the patient. Heparin and Cardizem verified at bedside.

## 2019-09-26 NOTE — ANESTHESIA PREPROCEDURE EVALUATION
Relevant Problems   No relevant active problems       Anesthetic History               Review of Systems / Medical History  Patient summary reviewed and pertinent labs reviewed    Pulmonary                   Neuro/Psych         TIA     Cardiovascular    Hypertension        Dysrhythmias : atrial fibrillation  CAD and hyperlipidemia    Exercise tolerance: <4 METS     GI/Hepatic/Renal     GERD           Endo/Other        Obesity     Other Findings   Comments: Was going to obtain EGD colon for dysphaiga as an outpatient, new onsent chest discomfort sent to cardiology who sent to ED. New onset Afib discovered and placed on heparin and cardizem gtt prior to plan for ALIZA/DCCV due to dysphagia history cardiology desire EGD to R/o stricture/stenosis.            Physical Exam    Airway  Mallampati: II  TM Distance: 4 - 6 cm  Neck ROM: normal range of motion   Mouth opening: Normal     Cardiovascular    Rhythm: regular  Rate: normal      Pertinent negatives: No murmur and peripheral edema   Dental  No notable dental hx       Pulmonary  Breath sounds clear to auscultation               Abdominal         Other Findings            Anesthetic Plan    ASA: 3  Anesthesia type: total IV anesthesia          Induction: Intravenous  Anesthetic plan and risks discussed with: Patient

## 2019-09-26 NOTE — PROCEDURES
ESOPHAGOGASTRODUODENOSCOPY    ESOPHAGOGASTRODUODENOSCOPY    DATE of PROCEDURE: 9/26/2019    PT NAME: Chris Joseph     [id number]     Indication: Dysphagia    MEDICATION:   TIVA    INSTRUMENT: GIF H190    SPECIAL PROCEDURE: None  BLOOD LOSS- min. SPEC- Distal esophageal bx to evaluate for EoE. PROCEDURE:  Standard EGD. Of note heparin was stopped for 4 hrs prior to the procedure. Only one bx taken as patient needed to get back on Heparin. ASSESSMENT:  1. Normal upper endoscopy    PLAN:   1. Continue Protonix 40 mg 2 X a day   2. Start full liquids  3. Resume Heparin  4. F/U distal esophageal Bx.     Arlester Shone, MD

## 2019-09-26 NOTE — PROGRESS NOTES
TRANSFER - IN REPORT:    Verbal report received from [place], [facility] (name) on Jasmyn Kennedy  being received from ED (unit) for routine progression of care      Report consisted of patients Situation, Background, Assessment and   Recommendations(SBAR). Information from the following report(s) SBAR, Kardex and MAR was reviewed with the receiving nurse. Opportunity for questions and clarification was provided. Assessment completed upon patients arrival to unit and care assumed.  \    RN states he will start Heparin gtt prior to coming to unit

## 2019-09-26 NOTE — PROGRESS NOTES
Problem: Afib Pathway: Day 1  Goal: Activity/Safety  Outcome: Resolved/Met  Goal: Consults, if ordered  Outcome: Resolved/Met  Goal: Diagnostic Test/Procedures  Outcome: Resolved/Met  Goal: Nutrition/Diet  Outcome: Resolved/Met  Note:   NPO p MN for possible procedure  Goal: Discharge Planning  Outcome: Resolved/Met  Goal: Medications  Outcome: Resolved/Met  Note:   Cardizem gtt infusing to control heart rate  Goal: Respiratory  Outcome: Resolved/Met  Goal: Treatments/Interventions/Procedures  Outcome: Resolved/Met  Goal: Psychosocial  Outcome: Resolved/Met  Goal: *Optimal pain control at patient's stated goal  Outcome: Resolved/Met  Note:   Chronic pain issues followed by pain MD.  Controlled with Percocet. Goal: *Hemodynamically stable  Outcome: Resolved/Met  Goal: *Stable cardiac rhythm  Outcome: Resolved/Met  Note:   Afib rate controlled with Cardizem gtt  Goal: *Lungs clear or at baseline  Outcome: Resolved/Met  Note:   Lungs clear  Goal: *Labs within defined limits  Outcome: Resolved/Met  Goal: *Describes available resources and support systems  Outcome: Resolved/Met     Problem: Falls - Risk of  Goal: *Absence of Falls  Description  Document Urvashi Fall Risk and appropriate interventions in the flowsheet.   Outcome: Progressing Towards Goal  Note:   Fall Risk Interventions:      Medication Interventions: Patient to call before getting OOB      Problem: Patient Education: Go to Patient Education Activity  Goal: Patient/Family Education  Outcome: Progressing Towards Goal

## 2019-09-26 NOTE — PROGRESS NOTES
TRANSFER - OUT REPORT:    Verbal report given to Rona Stephens on Brian Espinosa  being transferred to [facility](unit) for routine post - op       Report consisted of patients Situation, Background, Assessment and   Recommendations(SBAR). Information from the following report(s) SBAR was reviewed with the receiving nurse. Lines:   Peripheral IV 09/25/19 Left Hand (Active)   Site Assessment Clean, dry, & intact 9/26/2019  2:21 PM   Phlebitis Assessment 0 9/26/2019  2:21 PM   Infiltration Assessment 0 9/26/2019  2:21 PM   Dressing Status Clean, dry, & intact 9/26/2019  2:21 PM   Dressing Type Tape;Transparent 9/26/2019 12:00 PM   Hub Color/Line Status Flushed 9/26/2019 12:00 PM       Peripheral IV 09/25/19 Right Antecubital (Active)   Site Assessment Clean, dry, & intact 9/26/2019 12:00 PM   Phlebitis Assessment 0 9/26/2019 12:00 PM   Infiltration Assessment 0 9/26/2019 12:00 PM   Dressing Status Clean, dry, & intact 9/26/2019 12:00 PM   Dressing Type Tape;Transparent 9/26/2019 12:00 PM   Hub Color/Line Status Flushed 9/26/2019 12:00 PM        Opportunity for questions and clarification was provided.       Patient transported with:   Monitor

## 2019-09-26 NOTE — PROGRESS NOTES
Bedside and Verbal shift change report to be given to Thierry Goodson RN (oncoming nurse) by self Ronald fatima). Report included the following information SBAR, Kardex, MAR and Recent Results. Heparin and Cardizem gtts verified. Remains NPO for possible procedure.

## 2019-09-26 NOTE — PROGRESS NOTES
Care Management Interventions  PCP Verified by CM: Yes(Dr. Bianca López [phone] last seen in August 2019)  Palliative Care Criteria Met (RRAT>21 & CHF Dx)?: No(RRAT 8 Dx Atrial flutter with RVR)  Transition of Care Consult (CM Consult): Discharge Planning  Discharge Durable Medical Equipment: No(B/P Cuff and Glucometer)  Physical Therapy Consult: No  Occupational Therapy Consult: No  Speech Therapy Consult: No  Current Support Network: Lives with Spouse  Confirm Follow Up Transport: Self  Plan discussed with Pt/Family/Caregiver: Yes  Freedom of Choice Offered: Yes  Discharge Location  Discharge Placement: Home  Met with patient for d/c planning. Patient alert and oriented x 3, independent of ADL's and lives with her  in 2 story home. DME includes B/P cuff and glucometer. She has a  once a week to help with the cleaning as she has neck and back issues. She has transportation and able to drive. She has [employer] and obtains medications at [facility] [phone]. Current d/c plan is home when medically stable.

## 2019-09-26 NOTE — ROUTINE PROCESS
Vital signs stable. No complaints noted. Pt transferred back to room 305 via stretcher by Marzena Sidhu, [facility].

## 2019-09-26 NOTE — PROGRESS NOTES
Cardizem gtt titrated per MAR.     09/26/19 0256   Vital Signs   Pulse (Heart Rate) 66   Heart Rate Source Monitor   Cardiac Rhythm A Fib   BP (!) 93/38   MAP (Calculated) (!) 56

## 2019-09-26 NOTE — H&P
Date of Surgery Update:  Mara Kraft was seen and examined. History and physical has been reviewed. The patient has been examined.  There have been no significant clinical changes since the completion of the originally dated History and Physical.    Signed By: Veena June MD     September 26, 2019 3:02 PM

## 2019-09-26 NOTE — PROGRESS NOTES
Returned call for report. Spoke with María Green who states Salazar Kumar is currently out of the unit.

## 2019-09-26 NOTE — ANESTHESIA POSTPROCEDURE EVALUATION
Procedure(s):  ESOPHAGOGASTRODUODENOSCOPY (EGD)  ESOPHAGEAL DILATION ROOM 305 Not before 1500, Heparin stopped at 1105  ESOPHAGOGASTRODUODENAL (EGD) BIOPSY. total IV anesthesia    Anesthesia Post Evaluation      Multimodal analgesia: multimodal analgesia used between 6 hours prior to anesthesia start to PACU discharge  Patient location during evaluation: bedside  Patient participation: complete - patient participated  Level of consciousness: awake  Pain management: adequate  Airway patency: patent  Anesthetic complications: no  Cardiovascular status: acceptable and stable  Respiratory status: acceptable and room air  Hydration status: acceptable        Vitals Value Taken Time   /59 9/26/2019  4:03 PM   Temp     Pulse 80 9/26/2019  4:00 PM   Resp 49 9/26/2019  3:49 PM   SpO2 100 % 9/26/2019  4:00 PM   Vitals shown include unvalidated device data.

## 2019-09-26 NOTE — PROGRESS NOTES
Problem: Falls - Risk of  Goal: *Absence of Falls  Description  Document Jeannie Zamorano Fall Risk and appropriate interventions in the flowsheet.   Outcome: Progressing Towards Goal  Note:   Fall Risk Interventions:            Medication Interventions: Patient to call before getting OOB                   Problem: Afib Pathway: Day 2  Goal: *Hemodynamically stable  Outcome: Progressing Towards Goal  Goal: *Optimal pain control at patient's stated goal  Outcome: Progressing Towards Goal  Goal: *Stable cardiac rhythm  Outcome: Progressing Towards Goal  Goal: *Lungs clear or at baseline  Outcome: Progressing Towards Goal  Goal: *Describes available resources and support systems  Outcome: Progressing Towards Goal

## 2019-09-26 NOTE — ED NOTES
TRANSFER - OUT REPORT:    Verbal report given to Jesus Juares on Werner Pazem  being transferred to [facility] routine progression of care       Report consisted of patients Situation, Background, Assessment and   Recommendations(SBAR). Information from the following report(s) SBAR, ED Summary and MAR was reviewed with the receiving nurse. Lines:   Peripheral IV 09/25/19 Right; Lower Antecubital (Active)   Site Assessment Clean, dry, & intact 9/25/2019  2:58 PM   Phlebitis Assessment 0 9/25/2019  2:58 PM   Infiltration Assessment 0 9/25/2019  2:58 PM   Dressing Status Clean, dry, & intact 9/25/2019  2:58 PM   Hub Color/Line Status Pink 9/25/2019  2:58 PM       Peripheral IV 09/25/19 Left Hand (Active)   Site Assessment Clean, dry, & intact 9/25/2019  3:04 PM   Phlebitis Assessment 0 9/25/2019  3:04 PM   Infiltration Assessment 0 9/25/2019  3:04 PM   Dressing Status Clean, dry, & intact 9/25/2019  3:04 PM   Hub Color/Line Status Pink 9/25/2019  3:04 PM        Opportunity for questions and clarification was provided.       Patient transported with:   Monitor  Registered Nurse

## 2019-09-26 NOTE — PROGRESS NOTES
Bedside and Verbal shift change report given to self (oncoming nurse) by Montrell Roth RN (offgoing nurse). Report included the following information SBAR, Kardex, MAR and Recent Results.

## 2019-09-26 NOTE — ED NOTES
Attempted to call receiving RN for the second time to give report. Was told by  she is busy at this time caring for other patients, will attempt again.

## 2019-09-26 NOTE — CONSULTS
[facility] Consult Note       Consulting GI Physician: Dr. Lowery Spine    Referring Provider:  Deepak Mortensen NP    Consult Date:  9/26/2019    Admit Date:  9/25/2019    Chief Complaint:  Dysphagia    Subjective:     History of Present Illness:  Patient is a 59 y.o. female with PMH HTN, CAD with hx MI in 2014 with LCA/RCA stents in 2014 with patent stents on cath in 2016, ?TIA in the past without prior CVA, hx brain lesions dx several years ago, Arthritis, GERD, Hx colon polyps being seen in GI consult for dysphagia. She reports throat burning with frequent dysphagia to solid foods, pills, and liquids all of which can become stuck in the back of her throat when swallowing. This is associated with chronic intermittent nausea without vomiting as well as an intermittent epigastric to RUQ pain. Appetite has been ok and she denies unintentional weight loss though instead reports at least 20lb weight gain in the past few years. She was having regular BMs until recently starting a muscle relaxer. Now mild constipation with BM every few days. No hematochezia/melena. She had been on Omeprazole. She recently tried Dexilant though this was not helpful. She is currently on IV Protonix b.i.d. She as on ASA 325mg though recently had been taking 81mg tablet instead. She denies use of blood thinners prior to admission and denies hx regular NSAID use. PTA med list include Mobic though she denies use of this medication. Per pt, Abdominal US earlier this year was notable for fatty liver and a gallbladder polyp. She reports prior EGD and Colonoscopy in around 2012 with Dr. Shakila Crespo in [place], [place] that were \"ok\" though she reports hx of colon polyps and hx of \"nodules\" in her upper GI tract- will see if can locate records for review.   She was scheduled to have an EGD and Colonoscopy (for hx colon polyps) tomorrow with Dr. Candelaria Laguna though called our office with complaints of chest discomfort and instead was directed to see her Cardiologist who she states then directed her to come to the ER. On admission cardiac enzymes are negative. She has been diagnosed with new onset Afib which is now rate controlled on IV Cardizem and on Heparin gtt. Per Cardiology, planning ALIZA/DCCV once GI evaluation is complete. Planning change to NOAC post procedure. PMH:  Past Medical History:   Diagnosis Date    A-fib [facility]) 9/25/2019    Arthritis     CAD (coronary artery disease)     Cancer (HCC)     skin    GERD (gastroesophageal reflux disease)     Hypertension     Nausea & vomiting     Other ill-defined conditions(799.89)     high cholesterol; brain lesions, back pain    Stroke [facility])     ? TIA       PSH:  Past Surgical History:   Procedure Laterality Date    CARDIAC SURG PROCEDURE UNLIST      CATH - STENTS x2    HX ORTHOPAEDIC      knee surg       Allergies: Allergies   Allergen Reactions    Erythromycin Nausea and Vomiting    Sulfa (Sulfonamide Antibiotics) Nausea and Vomiting       Home Medications:  Prior to Admission medications    Medication Sig Start Date End Date Taking? Authorizing Provider   dexlansoprazole (DEXILANT) 60 mg CpDB capsule (delayed release) Take 60 mg by mouth daily. Yes Provider, Historical   potassium 99 mg tablet Take 550 mg by mouth daily. Yes Provider, Historical   torsemide (DEMADEX) 10 mg tablet Take  by mouth as needed. Yes Provider, Historical   ondansetron hcl (ZOFRAN) 4 mg tablet Take 4 mg by mouth every eight (8) hours as needed for Nausea. Yes Provider, Historical   aspirin (ASPIRIN) 325 mg tablet Take 81 mg by mouth daily. Yes Provider, Historical   olmesartan (BENICAR) 40 mg tablet Take 40 mg by mouth daily. Yes Provider, Historical   nitroglycerin (NITROSTAT) 0.4 mg SL tablet 1 Tab by SubLINGual route every five (5) minutes as needed for Chest Pain. 12/9/16  Yes Frankie Carbajal MD   loratadine (CLARITIN) 10 mg tablet Take 10 mg by mouth daily.    Yes Provider, Historical   hydroCHLOROthiazide (MICROZIDE) 12.5 mg capsule Take 12.5 mg by mouth daily. Yes Provider, Historical   Alpha Lipoic Acid 600 mg cap Take  by mouth daily. Yes Provider, Historical   co-enzyme Q-10 (CO Q-10) 100 mg capsule Take 100 mg by mouth daily. Yes Provider, Historical   metaxalone (SKELAXIN) 800 mg tablet Take  by mouth as needed. Yes Provider, Historical   ascorbic acid (VITAMIN C) 500 mg tablet Take 1,000 mg by mouth daily. Yes Other, MD Aparna   oxyCODONE (OXYIR) 5 mg capsule Take 10 mg by mouth every four (4) hours as needed. usu takes abt twice a day   Yes Rony, MD Aparna   simvastatin (ZOCOR) 10 mg tablet Take 40 mg by mouth nightly.    Yes Rony, MD Aparna       Hospital Medications:  Current Facility-Administered Medications   Medication Dose Route Frequency    acetaminophen (TYLENOL) tablet 650 mg  650 mg Oral Q4H PRN    rosuvastatin (CRESTOR) tablet 40 mg  40 mg Oral QHS    heparin 25,000 units in dextrose 500 mL infusion  12-25 Units/kg/hr IntraVENous TITRATE    dilTIAZem (CARDIZEM) 125 mg in dextrose 5% 125 mL infusion  0-15 mg/hr IntraVENous TITRATE    nitroglycerin (NITROSTAT) tablet 0.4 mg  0.4 mg SubLINGual Q5MIN PRN    olmesartan (BENICAR) tablet 40 mg  40 mg Oral DAILY    oxyCODONE-acetaminophen (PERCOCET 10)  mg per tablet 1 Tab  1 Tab Oral Q8H PRN    sodium chloride (NS) flush 5-40 mL  5-40 mL IntraVENous Q8H    sodium chloride (NS) flush 5-40 mL  5-40 mL IntraVENous PRN    aspirin chewable tablet 81 mg  81 mg Oral DAILY    nitroglycerin (NITROBID) 2 % ointment 1 Inch  1 Inch Topical Q6H    morphine injection 2 mg  2 mg IntraVENous Q4H PRN    influenza vaccine 2019-20 (6 mos+)(PF) (FLUARIX/FLULAVAL/FLUZONE QUAD) injection 0.5 mL  0.5 mL IntraMUSCular PRIOR TO DISCHARGE    pantoprazole (PROTONIX) tablet 40 mg  40 mg Oral BID       Social History:  Social History     Tobacco Use    Smoking status: Current Every Day Smoker     Packs/day: 1.50    Tobacco comment: ecig only   Substance Use Topics    Alcohol use: No       Family History:  Family History   Problem Relation Age of Onset    Heart Disease Mother        Review of Systems:  A detailed 10 system ROS is obtained, with pertinent positives as listed above. All others are negative. Diet:  NPO    Objective:     Physical Exam:  Vitals:  Visit Vitals  /56   Pulse 88   Temp 97.6 °F (36.4 °C)   Resp 18   Ht 5' 2\" (1.575 m)   Wt 80.8 kg (178 lb 1.6 oz)   SpO2 96%   Breastfeeding? No   BMI 32.57 kg/m²     Gen:  Pt is alert, cooperative, no acute distress  Skin:  Face reveal no rashes. HEENT: Sclerae anicteric. Cardiovascular: Regular rate and rhythm. Respiratory:  Comfortable breathing with no accessory muscle use. Clear breath sounds anteriorly with no wheezes, rales, or rhonchi. GI:  Abdomen nondistended, soft. +RUQ ttp. No guarding. Normal active bowel sounds. No obvious enlargement of the liver or spleen. No obvious masses palpable. Rectal:  Deferred  Musculoskeletal:  No pitting edema of the lower legs. Neurological:  Gross memory appears intact. Patient is alert and oriented. Psychiatric:  Mood appears appropriate with judgement intact. Laboratory:    Recent Labs     09/26/19  0323 09/25/19  1500   WBC 10.0 11.2*   HGB 12.6 14.7   HCT 38.6 44.5    279   MCV 93.2 92.3    142   K 3.7 4.3    105   CO2 28 29   BUN 21 24*   CREA 0.87 1.12*   CA 8.4 9.3   MG  --  2.2   * 129*   AP  --  93   SGOT  --  23   ALT  --  27   TBILI  --  0.4   ALB  --  4.0   TP  --  7.8   AML  --  57   LPSE  --  117   APTT 42.8* 27.3          Assessment:     Principal Problem:    A-fib (HCC) (9/25/2019)    Active Problems:    Dyslipidemia (4/2/2014)      Tobacco use disorder (4/2/2014)      Chronic lower back pain (2/23/2015)      Coronary atherosclerosis of native coronary vessel (2/24/2015)      Overview: 1. NSTEMI (3/31/14): Peak troponin 3.6      a.   Multivessel PCI:   mRCA: Xience 3.5 x 33 mm JEREMY. mLAD:  Xience 2.75       x 18 mm JEREMY      2. LHC (2/24/15):  EF 60%. Patent stents. Mild disease in other       segments. HTN (hypertension) (9/25/2019)      Dysphagia (9/25/2019)      Right upper quadrant pain (9/25/2019)      Atrial flutter with rapid ventricular response ([address] (9/25/2019)      59 y.o. female with PMH HTN, CAD with hx MI in 2014 with LCA/RCA stents in 2014 with patent stents on cath in 2016, ?TIA in the past without prior CVA, hx brain lesions dx several years ago, Arthritis, GERD, Hx colon polyps being seen in GI consult for solid food, pill, liquid dysphagia, chronic intermittent nausea, intermittent epigastric to RUQ discomfort. ON admission LFTs, Lipase WNL. WBC was increased at 11.2, now improved at 10. Hgb 12.6, BUN 21 with Creatinine 0.87. CXR was neg for acute changes. Per pt, Abdominal US earlier this year showed fatty liver and gallbladder polyp. Per pt, prior EGD and Colonoscopy in around 2012 with Dr. Dominick Benitez in [place], [place] were \"ok\" though she reports hx of colon polyps and hx of \"nodules\" in her upper GI tract- will see if can locate records for review. She was scheduled for EGD and Colonoscopy (for hx colon polyps) 9/27 with Dr. Segundo Delcid though presented to [facility] ER 9/25 for R sided chest discomfort with neg enzymes, new onset Afib (rate controlled on IV Cardizem, Heparin gtt). Planning ALIZA/DCCV once GI evaluation is complete. Planning change to NOAC post procedure. Plan:   -Will review office records- particularly records regarding previous endoscopic evaluation  -Agree with IV Protonix 40mg b.i.d  -We are keeping NPO for EGD later today. Have reviewed plan with Cardiology. Will need to hold Heparin gtt for at least 4hrs prior to this procedure and this has been cleared by Cardiology.  -Defer Colonoscopy until Cardiology work up complete. Can re-address in outpatient setting following time of hospital discharge.    -MiraLax 17g capful daily as needed. Further recommendations will be based upon findings on EGD and pt clinical course    Stanley Ignacio PA-C  [facility]    Patient is seen and examined in collaboration with Dr. Elizabeth Beckman.   Assessment and plan as per Dr. Elizabeth Beckman

## 2019-09-27 LAB
APTT PPP: 115 SEC (ref 24.7–39.8)
APTT PPP: 92.5 SEC (ref 24.7–39.8)

## 2019-09-27 PROCEDURE — 85730 THROMBOPLASTIN TIME PARTIAL: CPT

## 2019-09-27 PROCEDURE — 99218 HC RM OBSERVATION: CPT

## 2019-09-27 PROCEDURE — 36415 COLL VENOUS BLD VENIPUNCTURE: CPT

## 2019-09-27 PROCEDURE — 74011250637 HC RX REV CODE- 250/637: Performed by: INTERNAL MEDICINE

## 2019-09-27 PROCEDURE — 74011000250 HC RX REV CODE- 250: Performed by: INTERNAL MEDICINE

## 2019-09-27 PROCEDURE — 96366 THER/PROPH/DIAG IV INF ADDON: CPT

## 2019-09-27 PROCEDURE — 74011636320 HC RX REV CODE- 636/320: Performed by: INTERNAL MEDICINE

## 2019-09-27 PROCEDURE — 93458 L HRT ARTERY/VENTRICLE ANGIO: CPT

## 2019-09-27 PROCEDURE — 74011250637 HC RX REV CODE- 250/637: Performed by: NURSE PRACTITIONER

## 2019-09-27 PROCEDURE — 74011250636 HC RX REV CODE- 250/636: Performed by: INTERNAL MEDICINE

## 2019-09-27 PROCEDURE — 74011000258 HC RX REV CODE- 258: Performed by: INTERNAL MEDICINE

## 2019-09-27 PROCEDURE — 92960 CARDIOVERSION ELECTRIC EXT: CPT

## 2019-09-27 PROCEDURE — 93312 ECHO TRANSESOPHAGEAL: CPT

## 2019-09-27 PROCEDURE — 99152 MOD SED SAME PHYS/QHP 5/>YRS: CPT

## 2019-09-27 PROCEDURE — 74011250636 HC RX REV CODE- 250/636

## 2019-09-27 PROCEDURE — 99153 MOD SED SAME PHYS/QHP EA: CPT

## 2019-09-27 RX ORDER — LIDOCAINE HYDROCHLORIDE 10 MG/ML
10-200 INJECTION INFILTRATION; PERINEURAL ONCE
Status: COMPLETED | OUTPATIENT
Start: 2019-09-27 | End: 2019-09-27

## 2019-09-27 RX ORDER — FENTANYL CITRATE 50 UG/ML
25-50 INJECTION, SOLUTION INTRAMUSCULAR; INTRAVENOUS
Status: DISCONTINUED | OUTPATIENT
Start: 2019-09-27 | End: 2019-09-28 | Stop reason: HOSPADM

## 2019-09-27 RX ORDER — HEPARIN SODIUM 200 [USP'U]/100ML
3 INJECTION, SOLUTION INTRAVENOUS CONTINUOUS
Status: DISCONTINUED | OUTPATIENT
Start: 2019-09-27 | End: 2019-09-28 | Stop reason: HOSPADM

## 2019-09-27 RX ORDER — MIDAZOLAM HYDROCHLORIDE 1 MG/ML
.5-2 INJECTION, SOLUTION INTRAMUSCULAR; INTRAVENOUS
Status: DISCONTINUED | OUTPATIENT
Start: 2019-09-27 | End: 2019-09-28 | Stop reason: HOSPADM

## 2019-09-27 RX ADMIN — HEPARIN SODIUM 2 ML: 10000 INJECTION, SOLUTION INTRAVENOUS; SUBCUTANEOUS at 16:15

## 2019-09-27 RX ADMIN — Medication 5 ML: at 22:14

## 2019-09-27 RX ADMIN — MIDAZOLAM HYDROCHLORIDE 1 MG: 1 INJECTION, SOLUTION INTRAMUSCULAR; INTRAVENOUS at 15:52

## 2019-09-27 RX ADMIN — ROSUVASTATIN CALCIUM 40 MG: 20 TABLET, COATED ORAL at 22:14

## 2019-09-27 RX ADMIN — MIDAZOLAM HYDROCHLORIDE 2 MG: 1 INJECTION, SOLUTION INTRAMUSCULAR; INTRAVENOUS at 15:54

## 2019-09-27 RX ADMIN — NITROGLYCERIN 1 INCH: 20 OINTMENT TOPICAL at 05:20

## 2019-09-27 RX ADMIN — IOPAMIDOL 80 ML: 755 INJECTION, SOLUTION INTRAVENOUS at 16:35

## 2019-09-27 RX ADMIN — PANTOPRAZOLE SODIUM 40 MG: 40 TABLET, DELAYED RELEASE ORAL at 08:33

## 2019-09-27 RX ADMIN — FENTANYL CITRATE 50 MCG: 50 INJECTION, SOLUTION INTRAMUSCULAR; INTRAVENOUS at 15:48

## 2019-09-27 RX ADMIN — APIXABAN 5 MG: 5 TABLET, FILM COATED ORAL at 22:14

## 2019-09-27 RX ADMIN — MIDAZOLAM HYDROCHLORIDE 1 MG: 1 INJECTION, SOLUTION INTRAMUSCULAR; INTRAVENOUS at 15:49

## 2019-09-27 RX ADMIN — OLMESARTAN MEDOXOMIL 40 MG: 40 TABLET, FILM COATED ORAL at 08:33

## 2019-09-27 RX ADMIN — Medication 5 ML: at 05:03

## 2019-09-27 RX ADMIN — MIDAZOLAM HYDROCHLORIDE 2 MG: 1 INJECTION, SOLUTION INTRAMUSCULAR; INTRAVENOUS at 15:46

## 2019-09-27 RX ADMIN — LIDOCAINE HYDROCHLORIDE 2 ML: 10 INJECTION, SOLUTION INFILTRATION; PERINEURAL at 16:15

## 2019-09-27 RX ADMIN — ASPIRIN 81 MG 81 MG: 81 TABLET ORAL at 08:33

## 2019-09-27 RX ADMIN — HEPARIN SODIUM 3 ML/HR: 200 INJECTION, SOLUTION INTRAVENOUS at 16:03

## 2019-09-27 RX ADMIN — DILTIAZEM HYDROCHLORIDE 5 MG/HR: 5 INJECTION, SOLUTION INTRAVENOUS at 07:29

## 2019-09-27 RX ADMIN — PANTOPRAZOLE SODIUM 40 MG: 40 TABLET, DELAYED RELEASE ORAL at 17:35

## 2019-09-27 RX ADMIN — MIDAZOLAM HYDROCHLORIDE 2 MG: 1 INJECTION, SOLUTION INTRAMUSCULAR; INTRAVENOUS at 15:48

## 2019-09-27 RX ADMIN — FENTANYL CITRATE 50 MCG: 50 INJECTION, SOLUTION INTRAMUSCULAR; INTRAVENOUS at 15:46

## 2019-09-27 NOTE — PROGRESS NOTES
[facility] Progress Note         Admit Date:  9/25/2019    Today's Date:  9/27/2019    CC:  dysphagia    Subjective:     Patient s/p EGD 9/26 that was normal. Esophageal bx obtained to check for EoE. - see details below. She continues to feel something sitting in the back of her throat. She has had some nausea and some right mid chest discomfort off and on. No vomiting. No BM. At home she is able to take some kind of probiotic or digestive enzyme that she gets from [facility] and this helps have a BM regularly. Is passing some flatus. No GI bleeding. Medications:   Current Facility-Administered Medications   Medication Dose Route Frequency    acetaminophen (TYLENOL) tablet 650 mg  650 mg Oral Q4H PRN    rosuvastatin (CRESTOR) tablet 40 mg  40 mg Oral QHS    heparin 25,000 units in dextrose 500 mL infusion  12-25 Units/kg/hr IntraVENous TITRATE    dilTIAZem (CARDIZEM) 125 mg in dextrose 5% 125 mL infusion  0-15 mg/hr IntraVENous TITRATE    nitroglycerin (NITROSTAT) tablet 0.4 mg  0.4 mg SubLINGual Q5MIN PRN    olmesartan (BENICAR) tablet 40 mg  40 mg Oral DAILY    oxyCODONE-acetaminophen (PERCOCET 10)  mg per tablet 1 Tab  1 Tab Oral Q8H PRN    sodium chloride (NS) flush 5-40 mL  5-40 mL IntraVENous Q8H    sodium chloride (NS) flush 5-40 mL  5-40 mL IntraVENous PRN    aspirin chewable tablet 81 mg  81 mg Oral DAILY    nitroglycerin (NITROBID) 2 % ointment 1 Inch  1 Inch Topical Q6H    morphine injection 2 mg  2 mg IntraVENous Q4H PRN    influenza vaccine 2019-20 (6 mos+)(PF) (FLUARIX/FLULAVAL/FLUZONE QUAD) injection 0.5 mL  0.5 mL IntraMUSCular PRIOR TO DISCHARGE    pantoprazole (PROTONIX) tablet 40 mg  40 mg Oral BID       Review of Systems:  ROS was obtained, with pertinent positives as listed above. No chest pain or SOB.     Diet:  NPO    Objective:   Vitals:  Visit Vitals  /62   Pulse 84   Temp 98.3 °F (36.8 °C)   Resp 18   Ht 5' 2\" (1.575 m)   Wt 79.6 kg (175 lb 6.4 oz)   SpO2 96%   Breastfeeding? No   BMI 32.08 kg/m²     Intake/Output:  No intake/output data recorded. 09/25 1901 - 09/27 0700  In: 2516.8 [P.O.:1200; I.V.:1316.8]  Out: 800 [Urine:800]  Exam:  General appearance: alert, cooperative, no distress  Lungs: clear to auscultation bilaterally anteriorly  Heart: regular rate and rhythm  Abdomen: soft, non-tender. Bowel sounds normal. No masses, no organomegaly  Extremities: extremities normal, atraumatic, no cyanosis or edema  Neuro:  alert and oriented    Data Review (Labs):    Recent Labs     09/27/19  0432 09/26/19  2203 09/26/19  1101 09/26/19  0323 09/25/19  1500   WBC  --   --   --  10.0 11.2*   HGB  --   --   --  12.6 14.7   HCT  --   --   --  38.6 44.5   PLT  --   --   --  227 279   MCV  --   --   --  93.2 92.3   NA  --   --   --  141 142   K  --   --   --  3.7 4.3   CL  --   --   --  107 105   CO2  --   --   --  28 29   BUN  --   --   --  21 24*   CREA  --   --   --  0.87 1.12*   CA  --   --   --  8.4 9.3   MG  --   --   --   --  2.2   GLU  --   --   --  119* 129*   AP  --   --   --   --  93   SGOT  --   --   --   --  23   ALT  --   --   --   --  27   TBILI  --   --   --   --  0.4   ALB  --   --   --   --  4.0   TP  --   --   --   --  7.8   AML  --   --   --   --  57   LPSE  --   --   --   --  117   APTT 115.0* 57.7* 84.1* 42.8* 27.3     EGD 9/26/19  ASSESSMENT:  1. Normal upper endoscopy. Esophageal bx obtained to check for EoE     Assessment:     Principal Problem:    A-fib ([facility] [address] (9/25/2019)    Active Problems:    Dyslipidemia (4/2/2014)      Tobacco use disorder (4/2/2014)      Chronic lower back pain (2/23/2015)      Coronary atherosclerosis of native coronary vessel (2/24/2015)      Overview: 1. NSTEMI (3/31/14): Peak troponin 3.6      a. Multivessel PCI:   mRCA:  Xience 3.5 x 33 mm JEREMY. mLAD:  Xience 2.75       x 18 mm JEREMY      2. LHC (2/24/15):  EF 60%. Patent stents. Mild disease in other       segments.               HTN (hypertension) (9/25/2019)      Dysphagia (9/25/2019)      Right upper quadrant pain (9/25/2019)      Atrial flutter with rapid ventricular response ([address] (9/25/2019)        59 y.o. female with PMH HTN, CAD with hx MI in 2014 with LCA/RCA stents in 2014 with patent stents on cath in 2016, ?TIA in the past without prior CVA, hx brain lesions dx several years ago, Arthritis, GERD, Hx colon polyps being seen in GI consult for solid food, pill, liquid dysphagia, chronic intermittent nausea, intermittent epigastric to RUQ discomfort. ON admission LFTs, Lipase WNL. WBC was increased at 11.2, now improved at 10. Hgb 12.6, BUN 21 with Creatinine 0.87. CXR was neg for acute changes. Per pt, Abdominal US earlier this year showed fatty liver and gallbladder polyp. Per pt, prior EGD and Colonoscopy in around 2012 with Dr. Alvaro Ramírez in [place], [place] were \"ok\" though she reports hx of colon polyps and hx of \"nodules\" in her upper GI tract- will see if can locate records for review. She was scheduled for EGD and Colonoscopy (for hx colon polyps) 9/27 with Dr. Irene Mendenhall though presented to [facility] ER 9/25 for R sided chest discomfort with neg enzymes, new onset Afib (rate controlled on IV Cardizem, Heparin gtt). Planning ALIZA/DCCV once GI evaluation is complete. Planning change to NOAC post procedure.         Plan:     - F/U distal esophageal Bx.  - Continue Protonix 40 mg 2 X a day   - Antacids p.r.n  - Diet advancement as tolerated per GI. Currently NPO for cardiac procedures today. Per Cardiology- rate controlled on IV Cardizem. Heparin resumed post EGD. plan for ALIZA/DCCV, [facility] today. - MiraLax 17g capful daily as needed. - Defer Colonoscopy until Cardiology work up complete. Can re-address in outpatient setting following time of hospital discharge.      Michelle Wen PA-C  [facility]

## 2019-09-27 NOTE — PROCEDURES
Brief Cardiac Procedure Note    Patient: Karyle Lawman MRN: [id number]  SSN: [id number]    YOB: 1955  Age: 59 y.o. Sex: female      Date of Procedure: 9/27/2019     Pre-procedure Diagnosis: Typical Angina    Post-procedure Diagnosis: Non-cardiac Chest Pain    Procedure: Left Heart Catheterization    Brief Description of Procedure: As above    Performed By: Mateus Block MD     Assistants: None    Anesthesia: Moderate Sedation    Estimated Blood Loss: Less than 10 mL      Specimens: None    Implants: None    Findings: Patent RCA and LAD stents. Normal EF    Complications: None    Recommendations: Continue medical therapy.     Signed By: Mateus Block MD     September 27, 2019

## 2019-09-27 NOTE — PROCEDURES
[facility]  CARDIAC CATH    Name:  Leslie Glass  MR#:  [id number]  :  1955  ACCOUNT #:  [de-identified]  DATE OF SERVICE:  2019    PREOPERATIVE DIAGNOSIS:  Symptomatic atrial fibrillation. POSTOPERATIVE DIAGNOSIS:  Symptomatic atrial fibrillation. PROCEDURE PERFORMED:  Cardioversion of symptomatic atrial fibrillation. SURGEON:  Indiana Flores MD    ASSISTANT:  None. ANESTHESIA:  The patient received a moderate supervised conscious sedation with Versed and fentanyl. COMPLICATIONS:  None. FINDINGS:  As below. SPECIMENS REMOVED:  None. ESTIMATED BLOOD LOSS:  None. IMPLANTS:  None. TECHNICAL FINDINGS:  After informed consent was obtained, the patient was brought to the catheterization lab. She received moderate supervised conscious sedation with Versed and fentanyl. Transesophageal echocardiogram was performed which showed no evidence of clot within the left atrium, left atrial appendage, right atrium, right atrial appendage. It was felt it was safe to proceed with cardioversion. The defribrillation patch was placed in AP projection and 200 joules of synchronized biphasic energy was applied with restoration of sinus rhythm. No complications were encountered. CONCLUSION:  Successful DC cardioversion of symptomatic atrial fibrillation with 200 joules synchronized biphasic energy.       MD SERVANDO [email or web address]  D:  2019 16:46  T:  2019 17:14  JOB #:  [id number]

## 2019-09-27 NOTE — PROCEDURES
Brief Cardiac Procedure Note    Patient: Kandis Dickerson MRN: [id number]  SSN: [id number]    YOB: 1955  Age: 59 y.o. Sex: female      Date of Procedure: 9/27/2019     Pre-procedure Diagnosis: Atrial Fibrillation/Atrial Flutter    Post-procedure Diagnosis: Atrial fibrillation/Atrial Flutter    Procedure: Transesophageal Echocardiogram with Cardioversion    Brief Description of Procedure: See above    Performed By: Romie Bernardo MD     Assistants: None    Anesthesia: Moderate Sedation    Estimated Blood Loss: Less than 10 mL              Specimens: None    Implants: None    Findings: No clot in left atrium, left atrial appendage.  Successful DCCV to sinus rhythm.  Full report to follow. Complications: None    Recommendations: Continue medical therapy.       Signed By: Leopoldo Manges, MD     September 27, 2019

## 2019-09-27 NOTE — PROGRESS NOTES
Bedside and verbal report given to [facility] by [place]. Report included the following information SBAR, Kardex, Intake/Output and MAR. Oncoming RN assumed care of the patient.

## 2019-09-27 NOTE — PROGRESS NOTES
Bedside and Verbal shift change report given to self, RN (oncoming nurse) by Sun Szymanski RN (offgoing nurse). Report included the following information SBAR, Kardex, Intake/Output, MAR and Recent Results. Right radial cath site visualized with oncoming RN. Dressing CDI.

## 2019-09-27 NOTE — PROGRESS NOTES
TRANSFER - OUT REPORT:    Verbal report given to Eunice Vicente RN on Altagracia Barrera  being transferred to [facility] for routine progression of care       Report consisted of patients Situation, Background, Assessment and Recommendations(SBAR). Information from the following report(s) SBAR, Kardex, Procedure Summary and MAR was reviewed with the receiving nurse. Opportunity for questions and clarification was provided.       ALIZA/ CVN with Dr Ynes Link  Successful cardioversion with 200J to NSR  C with Dr Ynes Link  No intervention  Right radial Rband 12ml at [id number]    8 versed  100 fentanyl

## 2019-09-27 NOTE — PROCEDURES
[facility]  CARDIAC CATH    Name:  Claudean Dire  MR#:  [id number]  :  1955  ACCOUNT #:  [de-identified]  DATE OF SERVICE:  2019      PROCEDURES PERFORMED:  Left heart catheterization, selective coronary arteriography, and left ventriculogram via the right radial approach. PREOPERATIVE DIAGNOSIS:  Chest pain concerning for unstable angina in a patient with known coronary artery disease and prior percutaneous coronary intervention and stenting of the left anterior descending and right coronary artery. POSTOPERATIVE DIAGNOSIS:  Stable coronary artery disease. SURGEON:  Mikala Montes MD    SURGICAL ASSISTANT:  None. ANESTHESIA:  The patient received a total of 8 mg of Versed, 100 mcg of fentanyl for moderate supervised conscious sedation. Sedation was administered by Jorge Alberto Luis RN under my supervision. Sedation start time was 03:46 p.m. with a procedure completion time of 04:37 p.m. COMPLICATIONS:  None. FINDINGS:  As below. SPECIMENS REMOVED:  None. ESTIMATED BLOOD LOSS:  Less than 5 mL. IMPLANTS:  None. TECHNICAL FINDINGS:  After informed consent was obtained, the patient was brought to the cardiac catheterization lab. The right radial artery was prepped and draped in the usual sterile fashion. Utilizing modified Seldinger technique and micropuncture needle, the right radial artery was entered. A 6-Japanese Terumo slender sheath was placed without difficulty. A radial cocktail consisting of 2000 units of heparin, 2 mg of verapamil, 200 mcg of nitroglycerin was administered. A 5-Japanese Tiger 4.0 catheter was used to select and engage the ostium of the left main coronary artery and right coronary artery respectively. Selective injection verification performed. A pigtail catheter was used to cross the aortic valve into the left ventricle. Hemodynamic measurements and left ventriculogram were obtained.   Left ventricular aortic pressure gradient is obtained by pullback technique. At the conclusion of diagnostic procedure, the radial sheath was removed and a pneumatic band was placed with good hemostasis. No complications were encountered. CONTRAST:  Isovue 80. HEMODYNAMIC RESULTS:  1. Aortic pressure was 124/67 with a mean of 88.  2.  Left ventricular end-diastolic pressure is 20.  3. There is no significant gradient across the aortic valve. ANGIOGRAPHIC RESULTS:  1. Left main coronary artery:  Medium- to large-caliber vessel. Angiographically normal.  2.  LAD:  It is a medium- to large-caliber vessel proximally, becomes medium caliber in the mid segment. There is a stent in the mid vessel which appears to be patent with 10-20% in-stent restenosis. There is 20% mid LAD stenosis after the stented segment. The remainder of the LAD is patent. 3.  First diagonal artery:  Has been jailed by the previous stent. There is no significant obstruction at the ostium. The vessel is widely patent. 4.  Left circumflex:  Medium-caliber, nondominant vessel. 20% ostial/proximal stenosis. Basically terminates in the obtuse marginal which contains 10-20% luminal irregularities. 5.  Right coronary artery is a medium- to large-caliber dominant vessel. Contains a stent in the proximal to mid segment. Stent contains 20% in-stent restenosis. The distal vessel contains 20% stenosis. 6.  Right PDA:  Small- to medium-caliber vessel. Patent. 7.  Posterolateral branch:  Medium-caliber vessel. 30% mid stenosis. 8.  Left ventriculogram performed in ALONSO projection shows normal left ventricular systolic function. EF 60-65%. No focal segmental wall motion abnormalities. Aortic root is nondilated. CONCLUSIONS:  1. Patent RCA and LAD stents. 2.  Nonobstructive disease in remaining coronary arteries. 3.  Normal left ventricular systolic function. PLAN:  Ongoing GI evaluation.       Lennox Brash, MD [email or web address]  D:  09/27/2019 16:44  T:  09/27/2019 17:39  JOB #:  [id number]

## 2019-09-27 NOTE — PROGRESS NOTES
TRANSFER - OUT REPORT:    Verbal report given to Talibsharron Nell on Henryn Printers  being transferred to [facility] for routine post - op       Report consisted of patients Situation, Background, Assessment and   Recommendations(SBAR). Information from the following report(s) SBAR, Procedure Summary, Intake/Output, MAR and Cardiac Rhythm NSR was reviewed with the receiving nurse. Lines:   Peripheral IV 09/25/19 Left Hand (Active)   Site Assessment Clean, dry, & intact; Clean;Dry 9/27/2019  4:49 PM   Phlebitis Assessment 0 9/27/2019  4:49 PM   Infiltration Assessment 0 9/27/2019  4:49 PM   Dressing Status Clean, dry, & intact; Clean;Dry 9/27/2019  4:49 PM   Dressing Type Tape;Transparent 9/27/2019  4:49 PM   Hub Color/Line Status Patent; Infusing;Pink 9/27/2019  4:49 PM       Peripheral IV 09/25/19 Right Antecubital (Active)   Site Assessment Clean, dry, & intact; Clean;Dry 9/27/2019  4:49 PM   Phlebitis Assessment 0 9/27/2019  4:49 PM   Infiltration Assessment 0 9/27/2019  4:49 PM   Dressing Status Clean, dry, & intact; Clean;Dry 9/27/2019  4:49 PM   Dressing Type Tape;Transparent 9/27/2019  4:49 PM   Hub Color/Line Status Patent; Infusing;Pink 9/27/2019  4:49 PM        Opportunity for questions and clarification was provided.

## 2019-09-27 NOTE — PROGRESS NOTES
Bedside and Verbal shift change report given to self (oncoming nurse) by Leita Score (offgoing nurse). Report included the following information SBAR, Kardex, Intake/Output and MAR.

## 2019-09-27 NOTE — PROGRESS NOTES
TRANSFER - IN REPORT:    Verbal report received from COLIN Nelson on Altagracia Barrera being received from [address] [place] [address] for routine post - op      Report consisted of patients Situation, Background, Assessment and Recommendations(SBAR). Information from the following report(s) Procedure Summary was reviewed with the receiving nurse. Opportunity for questions and clarification was provided. Assessment completed upon patients arrival to unit and care assumed. R radial site visualized, no bleeding or hematoma noted.

## 2019-09-27 NOTE — PROGRESS NOTES
Report received from [facility] Cath Lab RN. Procedural findings communicated. Intra procedural  medication administration reviewed. Progression of care discussed.      Patient received into [address] [facility] 5 post sheath removal.     Access site without bleeding or swelling yes    Dressing dry and intact yes    Patient instructed to limit movement to right upper extremity    Routine post procedural vital signs and site assessment initiated yes

## 2019-09-28 VITALS
TEMPERATURE: 98.1 F | OXYGEN SATURATION: 98 % | HEART RATE: 82 BPM | RESPIRATION RATE: 18 BRPM | SYSTOLIC BLOOD PRESSURE: 108 MMHG | DIASTOLIC BLOOD PRESSURE: 47 MMHG | HEIGHT: 62 IN | BODY MASS INDEX: 32.48 KG/M2 | WEIGHT: 176.5 LBS

## 2019-09-28 LAB — APTT PPP: 26.9 SEC (ref 24.7–39.8)

## 2019-09-28 PROCEDURE — 74011250637 HC RX REV CODE- 250/637: Performed by: INTERNAL MEDICINE

## 2019-09-28 PROCEDURE — 36415 COLL VENOUS BLD VENIPUNCTURE: CPT

## 2019-09-28 PROCEDURE — 74011250637 HC RX REV CODE- 250/637: Performed by: NURSE PRACTITIONER

## 2019-09-28 PROCEDURE — 85730 THROMBOPLASTIN TIME PARTIAL: CPT

## 2019-09-28 PROCEDURE — 99218 HC RM OBSERVATION: CPT

## 2019-09-28 RX ORDER — METOPROLOL SUCCINATE 25 MG/1
25 TABLET, EXTENDED RELEASE ORAL DAILY
Qty: 30 TAB | Refills: 11 | Status: SHIPPED | OUTPATIENT
Start: 2019-09-28 | End: 2019-09-28 | Stop reason: SDUPTHER

## 2019-09-28 RX ORDER — DEXLANSOPRAZOLE 60 MG/1
60 CAPSULE, DELAYED RELEASE ORAL 2 TIMES DAILY
Qty: 60 CAP | Refills: 1 | Status: SHIPPED | OUTPATIENT
Start: 2019-09-28 | End: 2020-11-04

## 2019-09-28 RX ORDER — ROSUVASTATIN CALCIUM 40 MG/1
40 TABLET, COATED ORAL
Qty: 30 TAB | Refills: 11 | Status: SHIPPED | OUTPATIENT
Start: 2019-09-28 | End: 2019-09-28 | Stop reason: SDUPTHER

## 2019-09-28 RX ORDER — METOPROLOL SUCCINATE 50 MG/1
50 TABLET, EXTENDED RELEASE ORAL DAILY
Status: DISCONTINUED | OUTPATIENT
Start: 2019-09-28 | End: 2019-09-28

## 2019-09-28 RX ORDER — METOPROLOL SUCCINATE 25 MG/1
25 TABLET, EXTENDED RELEASE ORAL DAILY
Qty: 30 TAB | Refills: 11 | Status: SHIPPED | OUTPATIENT
Start: 2019-09-28 | End: 2021-05-27 | Stop reason: SDUPTHER

## 2019-09-28 RX ORDER — GUAIFENESIN 100 MG/5ML
81 LIQUID (ML) ORAL DAILY
Status: SHIPPED | COMMUNITY
Start: 2019-09-29

## 2019-09-28 RX ORDER — METOPROLOL SUCCINATE 25 MG/1
25 TABLET, EXTENDED RELEASE ORAL DAILY
Status: DISCONTINUED | OUTPATIENT
Start: 2019-09-28 | End: 2019-09-28 | Stop reason: HOSPADM

## 2019-09-28 RX ORDER — ROSUVASTATIN CALCIUM 40 MG/1
40 TABLET, COATED ORAL
Qty: 30 TAB | Refills: 11 | Status: SHIPPED | OUTPATIENT
Start: 2019-09-28 | End: 2020-11-04

## 2019-09-28 RX ORDER — DEXLANSOPRAZOLE 60 MG/1
60 CAPSULE, DELAYED RELEASE ORAL 2 TIMES DAILY
Qty: 60 CAP | Refills: 1 | Status: SHIPPED | OUTPATIENT
Start: 2019-09-28 | End: 2019-09-28 | Stop reason: SDUPTHER

## 2019-09-28 RX ADMIN — Medication 5 ML: at 05:54

## 2019-09-28 RX ADMIN — APIXABAN 5 MG: 5 TABLET, FILM COATED ORAL at 08:25

## 2019-09-28 RX ADMIN — OLMESARTAN MEDOXOMIL 40 MG: 40 TABLET, FILM COATED ORAL at 08:26

## 2019-09-28 RX ADMIN — ASPIRIN 81 MG 81 MG: 81 TABLET ORAL at 08:25

## 2019-09-28 RX ADMIN — METOPROLOL SUCCINATE 25 MG: 25 TABLET, EXTENDED RELEASE ORAL at 09:17

## 2019-09-28 RX ADMIN — PANTOPRAZOLE SODIUM 40 MG: 40 TABLET, DELAYED RELEASE ORAL at 08:25

## 2019-09-28 NOTE — PROGRESS NOTES
Verbal bedside report received from [place], [address] [place] [address]. Assumed care of patient.

## 2019-09-28 NOTE — PROGRESS NOTES
Bedside and Verbal shift change report given to Gordo Smith RN (oncoming nurse) by self, RN (offgoing nurse). Report included the following information SBAR, Kardex, Intake/Output, MAR and Recent Results. Right radial cath site visualized with oncoming RN.

## 2019-09-28 NOTE — DISCHARGE SUMMARY
[address] Cardiology Discharge Summary     Patient ID:  Darion Ballard  [id number]  58 y.o.  1955    Admit date: 9/25/2019    Discharge date:  9/28/2019    Admitting Physician: Jabier Hashimoto, MD     Discharge Physician: Dr. Carroll Bowles    Admission Diagnoses: Atrial flutter with rapid ventricular response [facility]) [I48.92]    Discharge Diagnoses:    Diagnosis    HTN (hypertension)    Dysphagia    A-fib     Right upper quadrant pain    Atrial flutter with rapid ventricular response     Non-cardiac chest pain    Coronary atherosclerosis of native coronary vessel    Chronic lower back pain    Benign hypertensive heart disease without CHF    Dyslipidemia    Tobacco use disorder       Cardiology Procedures this admission:  Diagnostic left heart catheterization  ALIZA/Cardioversion  Consults: GI    Hospital Course: Patient presented to the emergency department of [facility] with complaints of dysphagia, RUQ pain, palpitations and CP. The patient was noted to be in atrial fibrillation with RVR on arrival. A Cardizem bolus was given in the ER with good response. The patient was admitted to telemetry and Cardizem drip was continued. There were started on IV heparin for anticoagulation. GI was consulted and she underwent EGD on 9/26/19 by Dr. Irma Beyer with normal findings. The patient was monitored on telemetry. The patient did not convert back to sinus rhythm and ALIZA/Cardioversion as well as LHC was planned. The patient was taken to the [address] [place] [address] on 9/27/19 by Dr. Carroll Bowles. She was found to have stable disease with patent RCA and LAD stents. She also underwent ALIZA that was negative for thrombus and then underwent successful cardioversion from atrial fibrillation to sinus rhythm on 9/27/19 by Dr. Carroll Bowles. She was started on Eliquis for anticoagulation. The patient felt much better back in sinus rhythm. Zocor changed to crestor for continued elevated triglycerides.  The morning of 9/28/19, the patient was feeling much better and remained in sinus rhythm. Her RRA cath site was clean and dry without hematoma or bruit. Labs were stable. Indication for treatment and risk of ASA and eliquis therapy was discussed with the patient and he/she understands to seek medical care immediately if any signs of bleeding. The patient was seen and examined by Dr. Dasia Guerrero and was determined stable and ready for discharge home. The patient was instructed on the importance of medication compliance and outpatient follow up. For maximized medical therapy of CAD will cont BB, ARB and statin. E-cigarette use was discussed. The patient will follow up with [facility] in the next 3-4 weeks w Dr Dasia Guerrero (we will call pt with appt) and f/u with GI in 3-4 weeks. DISPOSITION: The patient is being discharged home in stable condition on a low saturated fat, low cholesterol and low salt diet. The patient is instructed to advance activities as tolerated to the limit of fatigue or shortness of breath. The patient is instructed to avoid all heavy lifting, straining, stooping or squatting for 3-5 days. The patient is instructed to watch the cath site for bleeding/oozing; if seen, the patient is instructed to apply firm pressure with a clean cloth and call [facility]iology at [phone]. The patient is instructed to watch for signs of infection which include: increasing area of redness, fever/hot to touch or purulent drainage at the catheterization site. The patient is instructed not to soak in a bathtub for 7-10 days, but is cleared to shower. The patient is instructed to call the office or return to the ER for immediate evaluation for any shortness of breath, chest pain not relieved by NTG, prolonged palpitations, near syncope or syncope. Bleeding precautions discussed as well with [address].         Discharge Exam:   Visit Vitals  /58   Pulse 82   Temp 98.1 °F (36.7 °C)   Resp 18   Ht 5' 2\" (1.575 m)   Wt 80.1 kg (176 lb 8 oz)   SpO2 98% Breastfeeding? No   BMI 32.28 kg/m²       Patient has been seen by Dr. Jose Manuel Mendez: see his progress note for exam details. Recent Results (from the past 24 hour(s))   PTT    Collection Time: 09/27/19 12:30 PM   Result Value Ref Range    aPTT 92.5 (H) 24.7 - 39.8 SEC   PTT    Collection Time: 09/28/19  4:11 AM   Result Value Ref Range    aPTT 26.9 24.7 - 39.8 SEC         Patient Instructions:     Current Discharge Medication List      START taking these medications    Details   aspirin 81 mg chewable tablet Take 1 Tab by mouth daily. apixaban (ELIQUIS) 5 mg tablet Take 1 Tab by mouth every twelve (12) hours. Qty: 60 Tab, Refills: 11      metoprolol succinate (TOPROL-XL) 25 mg XL tablet Take 1 Tab by mouth daily. Qty: 30 Tab, Refills: 11      rosuvastatin (CRESTOR) 40 mg tablet Take 1 Tab by mouth nightly. Qty: 30 Tab, Refills: 11         CONTINUE these medications which have CHANGED    Details   dexlansoprazole (DEXILANT) 60 mg CpDB capsule (delayed release) Take 1 Cap by mouth two (2) times a day. Qty: 60 Cap, Refills: 1         CONTINUE these medications which have NOT CHANGED    Details   potassium 99 mg tablet Take 550 mg by mouth daily. torsemide (DEMADEX) 10 mg tablet Take  by mouth as needed. ondansetron hcl (ZOFRAN) 4 mg tablet Take 4 mg by mouth every eight (8) hours as needed for Nausea. olmesartan (BENICAR) 40 mg tablet Take 40 mg by mouth daily. nitroglycerin (NITROSTAT) 0.4 mg SL tablet 1 Tab by SubLINGual route every five (5) minutes as needed for Chest Pain. Qty: 1 Bottle, Refills: 3      loratadine (CLARITIN) 10 mg tablet Take 10 mg by mouth daily. hydroCHLOROthiazide (MICROZIDE) 12.5 mg capsule Take 12.5 mg by mouth daily. Alpha Lipoic Acid 600 mg cap Take  by mouth daily. co-enzyme Q-10 (CO Q-10) 100 mg capsule Take 100 mg by mouth daily. metaxalone (SKELAXIN) 800 mg tablet Take  by mouth as needed.       ascorbic acid (VITAMIN C) 500 mg tablet Take 1,000 mg by mouth daily. oxyCODONE (OXYIR) 5 mg capsule Take 10 mg by mouth every four (4) hours as needed.  usu takes abt twice a day         STOP taking these medications       aspirin (ASPIRIN) 325 mg tablet Comments:   Reason for Stopping:         simvastatin (ZOCOR) 10 mg tablet Comments:   Reason for Stopping:

## 2019-09-28 NOTE — PROGRESS NOTES
Discharge instructions reviewed with patient. Prescriptions given for eliquis and metoprolol and med info sheets provided for all new medications. Opportunity for questions provided. Patient voiced understanding of all discharge instructions. Patient's IVs and telemetry monitor removed from patient.

## 2019-09-28 NOTE — PROGRESS NOTES
Problem: Falls - Risk of  Goal: *Absence of Falls  Description  Document [facility] [place] Fall Risk and appropriate interventions in the flowsheet.   Outcome: Progressing Towards Goal  Note:   Fall Risk Interventions:            Medication Interventions: Evaluate medications/consider consulting pharmacy, Patient to call before getting OOB, Teach patient to arise slowly                   Problem: Pain  Goal: *Control of Pain  Outcome: Progressing Towards Goal     Problem: Cath Lab Procedures: Post-Cath Day 1  Goal: Respiratory  Outcome: Progressing Towards Goal

## 2019-09-28 NOTE — PROGRESS NOTES
Pt is for discharge home today with no needs/supportive care orders recieved for CM at this time.   Care Management Interventions  PCP Verified by CM: Yes(Dr. Vinh Rivera [phone] last seen in August 2019)  Palliative Care Criteria Met (RRAT>21 & CHF Dx)?: No(RRAT 8 Dx Atrial flutter with RVR)  Transition of Care Consult (CM Consult): Discharge Planning  Discharge Durable Medical Equipment: No(B/P Cuff and Glucometer)  Physical Therapy Consult: No  Occupational Therapy Consult: No  Speech Therapy Consult: No  Current Support Network: Lives with Spouse  Confirm Follow Up Transport: Self  Plan discussed with Pt/Family/Caregiver: Yes  Freedom of Choice Offered: Yes  Discharge Location  Discharge Placement: Home

## 2019-09-28 NOTE — DISCHARGE INSTRUCTIONS
No lifting of 10 lbs or more for 7 days, no tub baths for a week, may shower, no creams, lotions powders, or ointments to site for a week. Cardiac Catheterization/Angiography Discharge Instructions    *Check the puncture site frequently for swelling or bleeding. If you see any bleeding, lie down and apply pressure over the area with a clean town or washcloth. Notify your doctor for any redness, swelling, drainage or oozing from the puncture site. Notify your doctor for any fever or chills. *If the leg or arm with the puncture becomes cold, numb or painful, call [facility] cardiology. *Activity should be limited for the next 48 hours. Climb stairs as little as possible and avoid any stooping, bending or strenuous activity for 48 hours. No heavy lifting (anything over 10 pounds) for three days. *Do not drive for 48 hours. *You may resume your usual diet. Drink more fluids than usual.    *Have a responsible person drive you home and stay with you for at least 24 hours after your heart catheterization/angiography. *You may remove the bandage  in 24 hours. You may shower in 24 hours. No tub baths, hot tubs or swimming for one week. Do not place any lotions, creams, powders, ointments over the puncture site for one week. You may place a clean band-aid over the puncture site each day for 5 days. Change this daily. Patient Education        Percutaneous Coronary Intervention: What to Expect at Home  Your Recovery    Percutaneous coronary intervention (PCI) is the name for procedures that are used to open a narrowed or blocked coronary artery. The two most common PCI procedures are coronary angioplasty and coronary stent placement. Your groin or arm may have a bruise and feel sore for a day or two after a percutaneous coronary intervention (PCI). You can do light activities around the house, but nothing strenuous for several days.   This care sheet gives you a general idea about how long it will take for you to recover. But each person recovers at a different pace. Follow the steps below to get better as quickly as possible. How can you care for yourself at home? Activity    · If the doctor gave you a sedative:  ? For 24 hours, don't do anything that requires attention to detail, such as going to work, making important decisions, or signing any legal documents. It takes time for the medicine's effects to completely wear off.  ? For your safety, do not drive or operate any machinery that could be dangerous. Wait until the medicine wears off and you can think clearly and react easily.     · Do not do strenuous exercise and do not lift, pull, or push anything heavy until your doctor says it is okay. This may be for a day or two. You can walk around the house and do light activity, such as cooking.     · If the catheter was placed in your groin, try not to walk up stairs for the first couple of days.     · If the catheter was placed in your arm near your wrist, do not bend your wrist deeply for the first couple of days. Be careful using your hand to get into and out of a chair or bed.     · Carry your stent identification card with you at all times.     · If your doctor recommends it, get more exercise. Walking is a good choice. Bit by bit, increase the amount you walk every day. Try for at least 30 minutes on most days of the week. Diet    · Drink plenty of fluids to help your body flush out the dye. If you have kidney, heart, or liver disease and have to limit fluids, talk with your doctor before you increase the amount of fluids you drink.     · Keep eating a heart-healthy diet that has lots of fruits, vegetables, and whole grains. If you have not been eating this way, talk to your doctor. You also may want to talk to a dietitian. This expert can help you to learn about healthy foods and plan meals. Medicines    · Your doctor will tell you if and when you can restart your medicines.  He or she will also give you instructions about taking any new medicines.     · If you take blood thinners, such as warfarin (Coumadin), clopidogrel (Plavix), or aspirin, be sure to talk to your doctor. He or she will tell you if and when to start taking those medicines again. Make sure that you understand exactly what your doctor wants you to do.     · Your doctor will prescribe blood-thinning medicines. You will likely take aspirin plus another antiplatelet, such as clopidogrel (Plavix). It is very important that you take these medicines exactly as directed. These medicines help keep the coronary artery open and reduce your risk of a heart attack.     · Call your doctor if you think you are having a problem with your medicine.    Care of the catheter site    · For 1 or 2 days, keep a bandage over the spot where the catheter was inserted. The bandage probably will fall off in this time.     · Put ice or a cold pack on the area for 10 to 20 minutes at a time to help with soreness or swelling. Put a thin cloth between the ice and your skin.     · You may shower 24 to 48 hours after the procedure, if your doctor okays it. Pat the incision dry.     · Do not soak the catheter site until it is healed. Don't take a bath for 1 week, or until your doctor tells you it is okay.     · Watch for bleeding from the site. A small amount of blood (up to the size of a quarter) on the bandage can be normal.     · If you are bleeding, lie down and press on the area for 15 minutes to try to make it stop. If the bleeding does not stop, call your doctor or seek immediate medical care. Follow-up care is a key part of your treatment and safety. Be sure to make and go to all appointments, and call your doctor if you are having problems. It's also a good idea to know your test results and keep a list of the medicines you take. When should you call for help? Call 911 anytime you think you may need emergency care.  For example, call if:    · You passed out (lost consciousness).     · You have severe trouble breathing.     · You have sudden chest pain and shortness of breath, or you cough up blood.     · You have symptoms of a heart attack, such as:  ? Chest pain or pressure. ? Sweating. ? Shortness of breath. ? Nausea or vomiting. ? Pain that spreads from the chest to the neck, jaw, or one or both shoulders or arms. ? Dizziness or lightheadedness. ? A fast or uneven pulse. After calling 911, chew 1 adult-strength aspirin. Wait for an ambulance. Do not try to drive yourself.     · You have been diagnosed with angina, and you have angina symptoms that do not go away with rest or are not getting better within 5 minutes after you take one dose of nitroglycerin.    Call your doctor now or seek immediate medical care if:    · You are bleeding from the area where the catheter was put in your artery.     · You have a fast-growing, painful lump at the catheter site.     · You have signs of infection, such as:  ? Increased pain, swelling, warmth, or redness. ? Red streaks leading from the catheter site. ? Pus draining from the catheter site. ? A fever.     · Your leg, arm, or hand is painful, looks blue, or feels cold, numb, or tingly.    Watch closely for changes in your health, and be sure to contact your doctor if you have any problems. Where can you learn more? Go to http://malcolm-larisa.info/. Enter J067 in the search box to learn more about \"Percutaneous Coronary Intervention: What to Expect at Home. \"  Current as of: April 9, 2019  Content Version: 12.2  © 4869-9579 [employer]. Care instructions adapted under license by [employer] (which disclaims liability or warranty for this information). If you have questions about a medical condition or this instruction, always ask your healthcare professional. [address] any warranty or liability for your use of this information. Patient Education        Transesophageal Echocardiogram: What to Expect at Home  Your Recovery  A transesophageal echocardiogram is a test to help your doctor look at the inside of your heart. A small device called a transducer directs sound waves toward your heart. The sound waves make a picture of the heart's valves and chambers. Before the test, your throat was sprayed with medicine to numb it. Your throat may be sore for a few days. You may have had a sedative to help you relax. You may be unsteady after having sedation. It can take a few hours for the medicine's effects to wear off. Common side effects include nausea, vomiting, and feeling sleepy or tired. This care sheet gives you a general idea about how long it will take for you to recover. But each person recovers at a different pace. Follow the steps below to feel better as quickly as possible. How can you care for yourself at home? Activity    · If a sedative was used, your doctor will tell you when it is safe for you to do your normal activities.     · For your safety, do not drive or operate any machinery that could be dangerous. Wait until the medicine wears off and you can think clearly and react easily. Diet    · Do not eat or drink until the numbness in your throat wears off.     · When the numbness is gone, you can eat your normal diet. Follow-up care is a key part of your treatment and safety. Be sure to make and go to all appointments, and call your doctor if you are having problems. It's also a good idea to know your test results and keep a list of the medicines you take. When should you call for help? Call 911 anytime you think you may need emergency care. For example, call if:    · Your stools are maroon or very bloody.     · You vomit blood or what looks like coffee grounds.     Call your doctor now or seek immediate medical care if:    · You have pain in your chest, belly, or back.     · You have new or worse trouble swallowing.   · You have trouble breathing.    Watch closely for changes in your health, and be sure to contact your doctor if you have any problems. Where can you learn more? Go to http://malcolm-larisa.info/. Enter S610 in the search box to learn more about \"Transesophageal Echocardiogram: What to Expect at Home. \"  Current as of: April 9, 2019  Content Version: 12.2  © 5931-9664 [employer]. Care instructions adapted under license by [employer] (which disclaims liability or warranty for this information). If you have questions about a medical condition or this instruction, always ask your healthcare professional. [place] [address] any warranty or liability for your use of this information. Patient Education        Atrial Fibrillation: Care Instructions  Your Care Instructions    Atrial fibrillation is an irregular and often fast heartbeat. Treating this condition is important for several reasons. It can cause blood clots, which can travel from your heart to your brain and cause a stroke. If you have a fast heartbeat, you may feel lightheaded, dizzy, and weak. An irregular heartbeat can also increase your risk for heart failure. Atrial fibrillation is often the result of another heart condition, such as high blood pressure or coronary artery disease. Making changes to improve your heart condition will help you stay healthy and active. Follow-up care is a key part of your treatment and safety. Be sure to make and go to all appointments, and call your doctor if you are having problems. It's also a good idea to know your test results and keep a list of the medicines you take. How can you care for yourself at home? Medicines    · Take your medicines exactly as prescribed. Call your doctor if you think you are having a problem with your medicine.  You will get more details on the specific medicines your doctor prescribes.     · If your doctor has given you a blood thinner to prevent a stroke, be sure you get instructions about how to take your medicine safely. Blood thinners can cause serious bleeding problems.     · Do not take any vitamins, over-the-counter drugs, or herbal products without talking to your doctor first.    Lifestyle changes    · Do not smoke. Smoking can increase your chance of a stroke and heart attack. If you need help quitting, talk to your doctor about stop-smoking programs and medicines. These can increase your chances of quitting for good.     · Eat a heart-healthy diet.     · Stay at a healthy weight. Lose weight if you need to.     · Limit alcohol to 2 drinks a day for men and 1 drink a day for women. Too much alcohol can cause health problems.     · Avoid colds and flu. Get a pneumococcal vaccine shot. If you have had one before, ask your doctor whether you need another dose. Get a flu shot every year. If you must be around people with colds or flu, wash your hands often. Activity    · If your doctor recommends it, get more exercise. Walking is a good choice. Bit by bit, increase the amount you walk every day. Try for at least 30 minutes on most days of the week. You also may want to swim, bike, or do other activities. Your doctor may suggest that you join a cardiac rehabilitation program so that you can have help increasing your physical activity safely.     · Start light exercise if your doctor says it is okay. Even a small amount will help you get stronger, have more energy, and manage stress. Walking is an easy way to get exercise. Start out by walking a little more than you did in the hospital. Gradually increase the amount you walk.     · When you exercise, watch for signs that your heart is working too hard. You are pushing too hard if you cannot talk while you are exercising. If you become short of breath or dizzy or have chest pain, sit down and rest immediately.     · Check your pulse regularly.  Place two fingers on the artery at the palm side of your wrist, in line with your thumb. If your heartbeat seems uneven or fast, talk to your doctor. When should you call for help? Call 911 anytime you think you may need emergency care. For example, call if:    · You have symptoms of a heart attack. These may include:  ? Chest pain or pressure, or a strange feeling in the chest.  ? Sweating. ? Shortness of breath. ? Nausea or vomiting. ? Pain, pressure, or a strange feeling in the back, neck, jaw, or upper belly or in one or both shoulders or arms. ? Lightheadedness or sudden weakness. ? A fast or irregular heartbeat. After you call 911, the  may tell you to chew 1 adult-strength or 2 to 4 low-dose aspirin. Wait for an ambulance. Do not try to drive yourself.     · You have symptoms of a stroke. These may include:  ? Sudden numbness, tingling, weakness, or loss of movement in your face, arm, or leg, especially on only one side of your body. ? Sudden vision changes. ? Sudden trouble speaking. ? Sudden confusion or trouble understanding simple statements. ? Sudden problems with walking or balance. ? A sudden, severe headache that is different from past headaches.     · You passed out (lost consciousness).    Call your doctor now or seek immediate medical care if:    · You have new or increased shortness of breath.     · You feel dizzy or lightheaded, or you feel like you may faint.     · Your heart rate becomes irregular.     · You can feel your heart flutter in your chest or skip heartbeats. Tell your doctor if these symptoms are new or worse.    Watch closely for changes in your health, and be sure to contact your doctor if you have any problems. Where can you learn more? Go to http://malcolm-larisa.info/. Enter U020 in the search box to learn more about \"Atrial Fibrillation: Care Instructions. \"  Current as of: April 9, 2019  Content Version: 12.2  © 4215-2603 [employer].  Care instructions adapted under license by [employer] (which disclaims liability or warranty for this information). If you have questions about a medical condition or this instruction, always ask your healthcare professional. [address] any warranty or liability for your use of this information. Patient Education   Apixaban (By mouth)   Apixaban (a-PIX-a-ban)  Treats and prevents blood clots. This medicine is a blood thinner. Brand Name(s): Eliquis   There may be other brand names for this medicine. When This Medicine Should Not Be Used: This medicine is not right for everyone. Do not use it if you had an allergic reaction to apixaban or you have active bleeding. How to Use This Medicine:   Tablet  · Your doctor will tell you how much medicine to use. Do not use more than directed. · If you are not able to swallow the tablets whole, they may be crushed and mixed in water, 5% dextrose in water (D5W), apple juice, or applesauce. The crushed tablets may be mixed with 60 mL of water or D5W dose and given through a nasogastric tube (NGT). · This medicine should come with a Medication Guide. Ask your pharmacist for a copy if you do not have one. · Missed dose: Take a dose as soon as you remember. If it is almost time for your next dose, wait until then and take a regular dose. Do not take extra medicine to make up for a missed dose. · Store the medicine in a closed container at room temperature, away from heat, moisture, and direct light. Drugs and Foods to Avoid:   Ask your doctor or pharmacist before using any other medicine, including over-the-counter medicines, vitamins, and herbal products. · Some medicines can affect how apixaban works.  Tell your doctor if you are using any of the following:   ¨ Carbamazepine, clarithromycin, itraconazole, ketoconazole, phenytoin, rifampin, ritonavir, Shari's wort  ¨ Blood thinner (including clopidogrel, heparin, prasugrel, warfarin)  ¨ Medicine to treat depression  ¨ NSAID pain or arthritis medicine (including aspirin, celecoxib, diclofenac, ibuprofen, naproxen)  Warnings While Using This Medicine:   · Tell your doctor if you are pregnant or breastfeeding, or if you have kidney disease, liver disease, bleeding problems, or an artificial heart valve. · Do not stop using this medicine suddenly without asking your doctor. You might have a higher risk of stroke for a short time after you stop using this medicine. · This medicine increases your risk for bleeding that can become serious if not controlled. You may also bruise easily, and it may take longer than usual for bleeding to stop. · This medicine may increase your risk for blood clots in your spine or back if you undergo an epidural or spinal puncture. This could lead to paralysis. Tell your doctor if you ever had spine problems or back surgery. · Tell any doctor or dentist who treats you that you are using this medicine. With your doctor's supervision, you may need to stop using this medicine several days before you have surgery or medical tests. · Your doctor will do lab tests at regular visits to check on the effects of this medicine. Keep all appointments. · Keep all medicine out of the reach of children. Never share your medicine with anyone.   Possible Side Effects While Using This Medicine:   Call your doctor right away if you notice any of these side effects:  · Allergic reaction: Itching or hives, swelling in your face or hands, swelling or tingling in your mouth or throat, chest tightness, trouble breathing  · Change in how much or how often you urinate, red or pink urine  · Chest pain, trouble breathing  · Coughing up blood, vomiting blood or material that looks like coffee grounds  · Numbness, tingling, or muscle weakness in your legs or feet  · Red or black, tarry stools  · Unusual bleeding, bruising, or weakness  If you notice other side effects that you think are caused by this medicine, tell your doctor. Call your doctor for medical advice about side effects. You may report side effects to FDA at 5-286-DMX-2301  © [facility] Information is for End User's use only and may not be sold, redistributed or otherwise used for commercial purposes. The above information is an  only. It is not intended as medical advice for individual conditions or treatments. Talk to your doctor, nurse or pharmacist before following any medical regimen to see if it is safe and effective for you. Patient Education   Dexlansoprazole (By mouth)   Dexlansoprazole (dex-kely-FORREST-pra-zole)  Treats heartburn, gastroesophageal reflux disease (GERD), or a damaged esophagus. This medicine is a proton pump inhibitor (PPI). Brand Name(s): Dexilant   There may be other brand names for this medicine. When This Medicine Should Not Be Used: This medicine is not right for everyone. Do not use it if you had an allergic reaction to dexlansoprazole. How to Use This Medicine:   Delayed Release Capsule, Tablet Disintegrating, Delayed Release  · Take this medicine as directed. You may need to take it for several weeks before you feel better. · Delayed-release capsule:   ¨ Swallow it whole. If you cannot swallow the capsule whole, you may open it and pour the medicine into a tablespoon of applesauce. Swallow the mixture right away without chewing. Do not store the mixed medicine for later use. ¨ If capsule is given through an oral syringe:   § Open the capsule and mix the contents with 20 milliliters (mL) of water. § Use an oral syringe to draw up all of the mixture and swirl gently. § Give the mixture right away. Do not chew. § Refill the syringe with 10 mL of water. Swirl gently. Swallow the water. § Refill the syringe with another 10 mL of water and swallow it right away. This will make sure you get your whole dose.   ¨ If capsule is given through a feeding tube:   § Open the capsule and combine the medicine with 20 milliliters (mL) of water. § Use a catheter-tip syringe to draw up the mixture and swirl gently. § Inject the medicine into the feeding tube right away. § Refill the syringe with 10 mL of water. Swirl it gently. Inject the water into the tube. § Refill with another 10 mL of water and inject this into the tube. This will make sure the full dose is given. · Delayed-release disintegrating tablet:   ¨ Take this medicine at least 30 minutes before a meal.  ¨ Do not break or cut the tablet. ¨ Place the tablet on the tongue, allow to dissolve, and swallow it without water. Do not chew the microgranules. Or, you may swallow the tablet whole with water. ¨ If tablet is given through an oral syringe:   § Place one tablet in an oral syringe and draw up 20 milliliters (mL) of water. § Swirl the syringe gently to keep the granules from settling. § Give the mixture directly into the mouth right away. Do not store the mixed medicine for later use. § To rinse any leftover medicine in the syringe, refill the syringe with 10 mL of water, swirl gently and swallow the water. § Repeat with an additional 10 mL of water. ¨ If tablet is given through a feeding tube:   § Place one tablet in a catheter-tip syringe and draw up 20 milliliters (mL) of water. § Shake gently to keep the granules from settling, and inject the medicine into the NG tube right away. § Refill the syringe with the 10 mL of water. § Shake it gently, and inject it into the tube to rinse any leftover medicine through the tube. § Repeat with an additional 10 mL of water. · This medicine should come with a Medication Guide. Ask your pharmacist for a copy if you do not have one. · Missed dose: Take a dose as soon as you remember. If it is almost time for your next dose, wait until then and take a regular dose. Do not take extra medicine to make up for a missed dose.   · Store the medicine in a closed container at room temperature, away from heat, moisture, and direct light. Drugs and Foods to Avoid:   Ask your doctor or pharmacist before using any other medicine, including over-the-counter medicines, vitamins, and herbal products. · Do not use this medicine if you are also using products containing rilpivirine. · Dexlansoprazole affects digestion and may keep other medicines from working correctly. Tell your doctor if you are using any of the following:  ¨ Ampicillin, atazanavir, dasatinib, digoxin, erlotinib, itraconazole, ketoconazole, methotrexate, mycophenolate mofetil, nelfinavir, nilotinib, rifampin, ritonavir, saquinavir, Shari's wort, tacrolimus, voriconazole  ¨ Blood thinner (including warfarin)  ¨ Diuretic (water pill)  ¨ Iron supplements  · Do not drink alcohol while you are using this medicine. Warnings While Using This Medicine:   · Tell your doctor if you are pregnant or breastfeeding, or if you have kidney disease, liver disease, lupus, or osteoporosis. · This medicine may cause the following problems:  ¨ Kidney problems  ¨ Low vitamin B12 or magnesium levels in the blood  ¨ Increased risk of broken bones in the hip, wrist, or spine  · This medicine can cause diarrhea. Call your doctor if the diarrhea becomes severe, does not stop, or is bloody. Do not take any medicine to stop diarrhea until you have talked to your doctor. Diarrhea can occur 2 months or more after you stop taking this medicine. · Tell any doctor or dentist who treats you that you are using this medicine. This medicine may affect certain medical test results. · Keep all medicine out of the reach of children. Never share your medicine with anyone.   Possible Side Effects While Using This Medicine:   Call your doctor right away if you notice any of these side effects:  · Allergic reaction: Itching or hives, swelling in your face or hands, swelling or tingling in your mouth or throat, chest tightness, trouble breathing  · Blistering, peeling, or red skin rash  · Fever, joint pain, swelling in your body, unusual weight gain, changes in how much or how often you urinate  · Joint pain, rash on your cheeks or arms that gets worse in the sun  · Seizures, dizziness, fast or uneven heartbeat, muscle cramps or twitching  · Severe diarrhea that does not go away, stomach cramps, fever  If you notice other side effects that you think are caused by this medicine, tell your doctor. Call your doctor for medical advice about side effects. You may report side effects to FDA at 4-930-CBS-9489  © 2017 [facility] Information is for End User's use only and may not be sold, redistributed or otherwise used for commercial purposes. The above information is an  only. It is not intended as medical advice for individual conditions or treatments. Talk to your doctor, nurse or pharmacist before following any medical regimen to see if it is safe and effective for you. Patient Education   Metoprolol (By mouth)   Metoprolol (met-oh-PROE-lol)  Treats high blood pressure, angina (chest pain), and heart failure. May lower the risk of death after a heart attack. This medicine is a beta-blocker. Brand Name(s): Lopressor, Toprol XL   There may be other brand names for this medicine. When This Medicine Should Not Be Used: This medicine is not right for everyone. Do not use if you had an allergic reaction to metoprolol or similar medicines. Do not use this medicine if you have certain blood circulation or heart problems. Ask your doctor about these problems. How to Use This Medicine:   Tablet, Long Acting Tablet  · Take your medicine as directed. Your dose may need to be changed several times to find what works best for you. · Take this medicine with a meal or right after a meal. Take this medicine the same way every day, at the same time. · Swallow the tablet whole with a glass of water.  You may break the extended-release tablet in half, but do not chew or crush it. · Missed dose: Take a dose as soon as you remember. If it is almost time for your next dose, wait until then and take a regular dose. Do not take extra medicine to make up for a missed dose. · Store the medicine in a closed container at room temperature, away from heat, moisture, and direct light. Drugs and Foods to Avoid:   Ask your doctor or pharmacist before using any other medicine, including over-the-counter medicines, vitamins, and herbal products. · Some medicines can affect how metoprolol works. Tell your doctor if you are taking any of the following:   ¨ Digoxin, dipyridamole, hydralazine, hydroxychloroquine, methyldopa, quinidine  ¨ Medicine to treat depression (such as bupropion, clomipramine, desipramine, fluoxetine, fluvoxamine, paroxetine, sertraline), medicine to treat mental illness (such as chlorpromazine, fluphenazine, haloperidol, thioridazine), medicine for heart rhythm problems (such as propafenone), HIV/AIDS medicine (such as ritonavir), medicine to treat a fungus infection (such as terbinafine), a monoamine oxidase inhibitor (MAOI), an ergot medicine for headaches, a calcium channel blocker (such as amlodipine, diltiazem, verapamil), or an alpha blocker (such as clonidine, prazosin, reserpine, guanethidine)  Warnings While Using This Medicine:   · Tell your doctor if you are pregnant or breastfeeding, or if you have blood vessel, heart, or circulation problems (such as heart failure, rhythm problems, or slow heartbeat). Tell your doctor if you have kidney disease, liver disease, diabetes, lung disease (such as asthma), an overactive thyroid, or a history of allergies. · This medicine may cause worse symptoms of heart failure while the dose is being adjusted. · Do not stop using this medicine suddenly. Your doctor will need to slowly decrease your dose before you stop it completely. · Tell any doctor or dentist who treats you that you are using this medicine.  You may need to stop using this medicine several days before you have surgery or medical tests. · This medicine could lower your blood pressure too much, especially when you first use it or if you are dehydrated. Stand or sit up slowly if you feel lightheaded or dizzy. · This medicine may make you dizzy or drowsy. Do not drive or do anything else that could be dangerous until you know how this medicine affects you. · Your doctor will check your progress and the effects of this medicine at regular visits. Keep all appointments. · Keep all medicine out of the reach of children. Never share your medicine with anyone. Possible Side Effects While Using This Medicine:   Call your doctor right away if you notice any of these side effects:  · Allergic reaction: Itching or hives, swelling in your face or hands, swelling or tingling in your mouth or throat, chest tightness, trouble breathing  · Lightheadedness, dizziness, or fainting  · Slow heartbeat  · Swelling in your hands, ankles, or feet, trouble breathing, tiredness  · Worsening chest pain  If you notice these less serious side effects, talk with your doctor:   · Diarrhea  · Mild dizziness or tiredness  If you notice other side effects that you think are caused by this medicine, tell your doctor. Call your doctor for medical advice about side effects. You may report side effects to FDA at 3-072-FDA-9643  © 2017 [facility] Information is for End User's use only and may not be sold, redistributed or otherwise used for commercial purposes. The above information is an  only. It is not intended as medical advice for individual conditions or treatments. Talk to your doctor, nurse or pharmacist before following any medical regimen to see if it is safe and effective for you.      Patient Education        Reducing Heart Attack Risk With Daily Medicine: Care Instructions  Your Care Instructions    If you are at risk for a heart attack, there are many medicines that can reduce your risk. These include:  · ACE inhibitors or ARBs. These are types of blood pressure medicines. They can reduce the risk of heart attacks and strokes if you are at high risk. · Statins and other cholesterol medicines. These lower cholesterol. They can also reduce the risk of a stroke. · Aspirin and other antiplatelets. If you are at high risk of a heart attack or stroke and at low risk of bleeding problems, your doctor might talk to you about taking an aspirin every day to lower your risk. Only take daily aspirin if your doctor recommends it because it's not right for everybody. · Beta-blocker medicines. These are a type of blood pressure and heart medicine. They can reduce the chance of early death if you have had a heart attack. All medicines can cause side effects. So it is important to understand the pros and cons of any medicine you take. It is also important to take your medicines exactly as your doctor tells you to. Follow-up care is a key part of your treatment and safety. Be sure to make and go to all appointments, and call your doctor if you are having problems. It's also a good idea to know your test results and keep a list of the medicines you take. ACE inhibitors  ACE (angiotensin-converting enzyme) inhibitors are used for three main reasons. They lower blood pressure, protect the kidneys, and prevent heart attacks and strokes. Examples include benazepril (Lotensin), lisinopril (Prinivil, Zestril), and ramipril (Altace). An angiotensin II receptor blocker (ARB) may be used instead of an ACE inhibitor. ARBs help you in the same ways as ACE inhibitors. Examples include candesartan (Atacand), irbesartan (Avapro), and losartan (Cozaar). Before you start taking an ACE inhibitor or an ARB, make sure your doctor knows if:  · You are taking a water pill (diuretic). · You are taking potassium pills or using salt substitutes.   · You are pregnant or breastfeeding. · You have had a kidney transplant or other kidney problems. ACE inhibitors and ARBs can cause side effects. Call your doctor right away if you have:  · Trouble breathing. · Swelling in your face, head, neck, or tongue. · Dizziness or lightheadedness. · A dry cough. Statins  Statins lower cholesterol. Examples include atorvastatin (Lipitor), lovastatin (Mevacor), pravastatin (Pravachol), and simvastatin (Zocor). Before you start taking a statin, make sure your doctor knows if:  · You have had a kidney transplant or other kidney problems. · You have liver disease. · You take any other prescription medicine, over-the-counter medicine, vitamins, supplements, or herbal remedies. · You are pregnant or breastfeeding. Statins can cause side effects. Call your doctor right away if you have:  · New, severe muscle aches. · Brown urine. Aspirin  If you are at high risk of a heart attack, your doctor might talk to you about taking an aspirin every day to lower your risk. A heart attack occurs when a blood vessel in the heart gets blocked. When this happens, oxygen can't get to the heart muscle, and part of the heart dies. Aspirin can help prevent blood clots that can block the blood vessels. But talk to your doctor before you start taking aspirin every day. Daily aspirin isn't right for most people. This is because it can cause serious bleeding. You and your doctor can decide if aspirin is a good choice for you based on your risk of a heart attack and your risk of serious bleeding. You may not be able to use aspirin if you:  · Have asthma or certain other health conditions. · Have an ulcer or other stomach problem. · Take some other medicine (called a blood thinner) that prevents blood clots. · Are allergic to aspirin. Your doctor may recommend that you take one low-dose aspirin (81 mg) tablet each day, with a meal and a full glass of water.   Before having a surgery or procedure, tell your doctor or dentist that you take aspirin. He or she will tell you if you should stop taking aspirin beforehand. Make sure that you understand exactly what your doctor wants you to do. Aspirin can cause side effects. Call your doctor right away if you have:  · Unusual bleeding or bruising. · Nausea, vomiting, or heartburn. · Black or bloody stools. Beta-blockers  Beta-blockers are used for three main reasons. They lower blood pressure, relieve angina symptoms (such as chest pain or pressure), and reduce the chances of a second heart attack. They include atenolol (Tenormin), carvedilol (Coreg), and metoprolol (Lopressor). Before you start taking a beta-blocker, make sure your doctor knows if you have:  · Severe asthma or frequent asthma attacks. · A very slow pulse (less than 55 beats a minute). Beta-blockers can cause side effects. Call your doctor right away if you have:  · Wheezing or trouble breathing. · Dizziness or lightheadedness. · Asthma that gets worse. When should you call for help? Watch closely for changes in your health, and be sure to contact your doctor if you have any problems. Where can you learn more? Go to http://malcolm-larisa.info/. Enter R428 in the search box to learn more about \"Reducing Heart Attack Risk With Daily Medicine: Care Instructions. \"  Current as of: April 9, 2019  Content Version: 12.2  © 3301-9071 [employer]. Care instructions adapted under license by [employer] (which disclaims liability or warranty for this information). If you have questions about a medical condition or this instruction, always ask your healthcare professional. [address] any warranty or liability for your use of this information.          DISCHARGE SUMMARY from Nurse    PATIENT INSTRUCTIONS:    After general anesthesia or intravenous sedation, for 24 hours or while taking prescription Narcotics:  · Limit your activities  · Do not drive and operate hazardous machinery  · Do not make important personal or business decisions  · Do  not drink alcoholic beverages  · If you have not urinated within 8 hours after discharge, please contact your surgeon on call. Report the following to your surgeon:  · Excessive pain, swelling, redness or odor of or around the surgical area  · Temperature over 100.5  · Nausea and vomiting lasting longer than 4 hours or if unable to take medications  · Any signs of decreased circulation or nerve impairment to extremity: change in color, persistent  numbness, tingling, coldness or increase pain  · Any questions    What to do at Home:  Recommended activity: Activity as tolerated,     If you experience any of the following symptoms shortness of breath or chest pain or irregular rhythm, please follow up with [facility]. *  Please give a list of your current medications to your Primary Care Provider. *  Please update this list whenever your medications are discontinued, doses are      changed, or new medications (including over-the-counter products) are added. *  Please carry medication information at all times in case of emergency situations. These are general instructions for a healthy lifestyle:    No smoking/ No tobacco products/ Avoid exposure to second hand smoke  Surgeon General's Warning:  Quitting smoking now greatly reduces serious risk to your health. Obesity, smoking, and sedentary lifestyle greatly increases your risk for illness    A healthy diet, regular physical exercise & weight monitoring are important for maintaining a healthy lifestyle    You may be retaining fluid if you have a history of heart failure or if you experience any of the following symptoms:  Weight gain of 3 pounds or more overnight or 5 pounds in a week, increased swelling in our hands or feet or shortness of breath while lying flat in bed.   Please call your doctor as soon as you notice any of these symptoms; do not wait until your next office visit. The discharge information has been reviewed with the patient. The patient verbalized understanding. Discharge medications reviewed with the patient and appropriate educational materials and side effects teaching were provided.   ___________________________________________________________________________________________________________________________________

## 2020-03-12 NOTE — PROGRESS NOTES
I have personally reviewed device interrogation.      Electronically signed,     Guillermo Erazo MD  03/11/20  8:20 PM

## 2021-05-27 ENCOUNTER — HOSPITAL ENCOUNTER (OUTPATIENT)
Dept: LAB | Age: 66
Discharge: HOME | End: 2021-05-27
Payer: MEDICARE

## 2021-05-27 DIAGNOSIS — R30.0 DYSURIA: ICD-10-CM

## 2021-05-27 PROBLEM — E11.9 CONTROLLED TYPE 2 DIABETES MELLITUS WITHOUT COMPLICATION, WITHOUT LONG-TERM CURRENT USE OF INSULIN (HCC): Status: ACTIVE | Noted: 2021-05-27

## 2021-05-27 PROBLEM — I48.0 PAROXYSMAL ATRIAL FIBRILLATION (HCC): Status: ACTIVE | Noted: 2019-09-25

## 2021-05-27 PROBLEM — I48.92 ATRIAL FLUTTER WITH RAPID VENTRICULAR RESPONSE (HCC): Status: RESOLVED | Noted: 2019-09-25 | Resolved: 2021-05-27

## 2021-05-27 LAB
APPEARANCE UR: ABNORMAL
BILIRUB UR QL: NEGATIVE
COLOR UR: YELLOW
GLUCOSE UR STRIP.AUTO-MCNC: NEGATIVE MG/DL
HGB UR QL STRIP: NEGATIVE
KETONES UR QL STRIP.AUTO: NEGATIVE MG/DL
LEUKOCYTE ESTERASE UR QL STRIP.AUTO: NEGATIVE
NITRITE UR QL STRIP.AUTO: NEGATIVE
PH UR STRIP: 5 [PH] (ref 5–9)
PROT UR STRIP-MCNC: NEGATIVE MG/DL
SP GR UR REFRACTOMETRY: 1.02 (ref 1–1.02)
UROBILINOGEN UR QL STRIP.AUTO: 0.2 EU/DL (ref 0.2–1)

## 2021-05-27 PROCEDURE — 36415 COLL VENOUS BLD VENIPUNCTURE: CPT

## 2021-05-27 PROCEDURE — 81003 URINALYSIS AUTO W/O SCOPE: CPT

## 2022-03-19 PROBLEM — R10.11 RIGHT UPPER QUADRANT PAIN: Status: ACTIVE | Noted: 2019-09-25

## 2022-03-19 PROBLEM — I10 HTN (HYPERTENSION): Status: ACTIVE | Noted: 2019-09-25

## 2022-03-19 PROBLEM — R13.10 DYSPHAGIA: Status: ACTIVE | Noted: 2019-09-25

## 2022-03-19 PROBLEM — I48.0 PAROXYSMAL ATRIAL FIBRILLATION (HCC): Status: ACTIVE | Noted: 2019-09-25

## 2022-03-20 PROBLEM — R30.0 DYSURIA: Status: ACTIVE | Noted: 2021-05-27

## 2022-03-20 PROBLEM — E11.9 CONTROLLED TYPE 2 DIABETES MELLITUS WITHOUT COMPLICATION, WITHOUT LONG-TERM CURRENT USE OF INSULIN (HCC): Status: ACTIVE | Noted: 2021-05-27

## 2022-06-02 ENCOUNTER — TELEPHONE (OUTPATIENT)
Dept: CARDIOLOGY CLINIC | Age: 67
End: 2022-06-02

## 2022-06-02 NOTE — TELEPHONE ENCOUNTER
Left message on voice mail to call and schedule yearly exam. Prescription sent to pharmacy[email or web address]  Requested Prescriptions     Signed Prescriptions Disp Refills    apixaban (ELIQUIS) 5 MG TABS tablet 180 tablet 0     Sig: Take 1 tablet by mouth every 12 hours     Authorizing Provider: Maikel Stewart     Ordering User: Burnadette Severs

## 2022-06-02 NOTE — TELEPHONE ENCOUNTER
Pt is calling wanting a  rx for Eliquis 5 mg ,2 pills a day called into [facility] in [place] ,pt is out of medicine

## 2022-08-05 NOTE — TELEPHONE ENCOUNTER
MEDICATION REFILL REQUEST      Name of Medication:  eliquis  Dose:  5 mg  Frequency:  2/Day  Quantity:  per physician  Days' supply:  per physician      Pharmacy Name/Location:  [facility]

## 2022-08-05 NOTE — TELEPHONE ENCOUNTER
MEDICATION REFILL REQUEST      Name of Medication:  multaq  Dose:  400mg  Frequency:  1/day  Quantity:  per physician  Days' supply:  per physician      Pharmacy Name/Location:  [facility] in [place]

## 2022-08-30 ENCOUNTER — TELEPHONE (OUTPATIENT)
Dept: CARDIOLOGY CLINIC | Age: 67
End: 2022-08-30

## 2022-09-28 ENCOUNTER — OFFICE VISIT (OUTPATIENT)
Dept: CARDIOLOGY CLINIC | Age: 67
End: 2022-09-28
Payer: MEDICARE

## 2022-09-28 VITALS — HEART RATE: 73 BPM | DIASTOLIC BLOOD PRESSURE: 62 MMHG | SYSTOLIC BLOOD PRESSURE: 132 MMHG | HEIGHT: 62 IN

## 2022-09-28 DIAGNOSIS — I10 PRIMARY HYPERTENSION: ICD-10-CM

## 2022-09-28 DIAGNOSIS — E78.5 DYSLIPIDEMIA: ICD-10-CM

## 2022-09-28 DIAGNOSIS — I48.0 PAROXYSMAL ATRIAL FIBRILLATION (HCC): Primary | ICD-10-CM

## 2022-09-28 DIAGNOSIS — I25.10 ATHEROSCLEROSIS OF NATIVE CORONARY ARTERY OF NATIVE HEART WITHOUT ANGINA PECTORIS: ICD-10-CM

## 2022-09-28 DIAGNOSIS — I48.0 PAROXYSMAL ATRIAL FIBRILLATION (HCC): ICD-10-CM

## 2022-09-28 LAB
ALBUMIN SERPL-MCNC: 4.1 G/DL (ref 3.2–4.6)
ALBUMIN/GLOB SERPL: 1.3 {RATIO} (ref 1.2–3.5)
ALP SERPL-CCNC: 121 U/L (ref 50–136)
ALT SERPL-CCNC: 31 U/L (ref 12–65)
ANION GAP SERPL CALC-SCNC: 8 MMOL/L (ref 4–13)
AST SERPL-CCNC: 15 U/L (ref 15–37)
BILIRUB SERPL-MCNC: 0.3 MG/DL (ref 0.2–1.1)
BUN SERPL-MCNC: 24 MG/DL (ref 8–23)
CALCIUM SERPL-MCNC: 9.4 MG/DL (ref 8.3–10.4)
CHLORIDE SERPL-SCNC: 105 MMOL/L (ref 101–110)
CHOLEST SERPL-MCNC: 164 MG/DL
CO2 SERPL-SCNC: 28 MMOL/L (ref 21–32)
CREAT SERPL-MCNC: 1.1 MG/DL (ref 0.6–1)
ERYTHROCYTE [DISTWIDTH] IN BLOOD BY AUTOMATED COUNT: 13.4 % (ref 11.9–14.6)
GLOBULIN SER CALC-MCNC: 3.1 G/DL (ref 2.3–3.5)
GLUCOSE SERPL-MCNC: 181 MG/DL (ref 65–100)
HCT VFR BLD AUTO: 38.3 % (ref 35.8–46.3)
HDLC SERPL-MCNC: 39 MG/DL (ref 40–60)
HDLC SERPL: 4.2 {RATIO}
HGB BLD-MCNC: 12 G/DL (ref 11.7–15.4)
LDLC SERPL CALC-MCNC: ABNORMAL MG/DL
LDLC SERPL DIRECT ASSAY-MCNC: 74 MG/DL
MCH RBC QN AUTO: 29 PG (ref 26.1–32.9)
MCHC RBC AUTO-ENTMCNC: 31.3 G/DL (ref 31.4–35)
MCV RBC AUTO: 92.5 FL (ref 79.6–97.8)
NRBC # BLD: 0 K/UL (ref 0–0.2)
PLATELET # BLD AUTO: 253 K/UL (ref 150–450)
PMV BLD AUTO: 9.6 FL (ref 9.4–12.3)
POTASSIUM SERPL-SCNC: 4.2 MMOL/L (ref 3.5–5.1)
PROT SERPL-MCNC: 7.2 G/DL (ref 6.3–8.2)
RBC # BLD AUTO: 4.14 M/UL (ref 4.05–5.2)
SODIUM SERPL-SCNC: 141 MMOL/L (ref 136–145)
TRIGL SERPL-MCNC: 520 MG/DL (ref 35–150)
VLDLC SERPL CALC-MCNC: 104 MG/DL (ref 6–23)
WBC # BLD AUTO: 7.2 K/UL (ref 4.3–11.1)

## 2022-09-28 PROCEDURE — 3017F COLORECTAL CA SCREEN DOC REV: CPT | Performed by: INTERNAL MEDICINE

## 2022-09-28 PROCEDURE — 1123F ACP DISCUSS/DSCN MKR DOCD: CPT | Performed by: INTERNAL MEDICINE

## 2022-09-28 PROCEDURE — 1090F PRES/ABSN URINE INCON ASSESS: CPT | Performed by: INTERNAL MEDICINE

## 2022-09-28 PROCEDURE — 99214 OFFICE O/P EST MOD 30 MIN: CPT | Performed by: INTERNAL MEDICINE

## 2022-09-28 PROCEDURE — G8427 DOCREV CUR MEDS BY ELIG CLIN: HCPCS | Performed by: INTERNAL MEDICINE

## 2022-09-28 PROCEDURE — 93000 ELECTROCARDIOGRAM COMPLETE: CPT | Performed by: INTERNAL MEDICINE

## 2022-09-28 PROCEDURE — G8421 BMI NOT CALCULATED: HCPCS | Performed by: INTERNAL MEDICINE

## 2022-09-28 PROCEDURE — 4004F PT TOBACCO SCREEN RCVD TLK: CPT | Performed by: INTERNAL MEDICINE

## 2022-09-28 PROCEDURE — G8400 PT W/DXA NO RESULTS DOC: HCPCS | Performed by: INTERNAL MEDICINE

## 2022-09-28 ASSESSMENT — ENCOUNTER SYMPTOMS: SHORTNESS OF BREATH: 0

## 2022-09-28 NOTE — PROGRESS NOTES
[facility], [address] [place] [address], [address] [place] [address]  PHONE: [phone]    Deisi Jones  1955      SUBJECTIVE:   Deisi Jones is a 79 y.o. female seen for a follow up visit regarding the following:     No chief complaint on file. HPI:    Deisi Jones presents for routine follow up for known Coronary Artery Disease. Multiple issues addressed as outlined below:     Coronary Artery Disease:  Patient denies any recent angina. Notes compliance with medical therapy. No recent NTG use. No intolerance to anti-platelet therapy. Hypertension:  Ambulatory BP readings have been controlled. Patient reports compliance with medical therapy without side effects. Hyperlipidemia:   Tolerating Crestor. No recent lipids. Last 8/21. Cervical spinal issues:  Needs surgery but not willing to proceed at present. Getting second opinion. Tobacco use: Smoking e-cigarettes. Past Medical History, Past Surgical History, Family history, Social History, and Medications were all reviewed with the patient today and updated as necessary. Current Outpatient Medications:     apixaban (ELIQUIS) 5 MG TABS tablet, Take 1 tablet by mouth in the morning and 1 tablet in the evening., Disp: 180 tablet, Rfl: 3    dronedarone hcl (MULTAQ) 400 MG TABS, Take 1 tablet by mouth in the morning and 1 tablet in the evening. Take with meals. , Disp: 180 tablet, Rfl: 3    albuterol sulfate HFA (PROVENTIL;VENTOLIN;PROAIR) 108 (90 Base) MCG/ACT inhaler, TAKE 2 PUFFS BY MOUTH EVERY 6 HOURS AS NEEDED FOR WHEEZE OR FOR SHORTNESS OF BREATH, Disp: , Rfl:     ascorbic acid (VITAMIN C) 500 MG tablet, Take 1,000 mg by mouth daily, Disp: , Rfl:     aspirin 81 MG chewable tablet, Take 81 mg by mouth daily, Disp: , Rfl:     coenzyme Q10 100 MG CAPS capsule, Take 100 mg by mouth daily, Disp: , Rfl:     cyanocobalamin 1000 MCG tablet, Take 1,000 mcg by mouth daily, Disp: , Rfl:     esomeprazole (NEXIUM) 40 MG delayed release capsule, Take 40 mg by mouth daily, Disp: , Rfl:     hydroCHLOROthiazide (MICROZIDE) 12.5 MG capsule, Take 12.5 mg by mouth daily, Disp: , Rfl:     loratadine (CLARITIN) 10 MG tablet, Take 10 mg by mouth daily, Disp: , Rfl:     metFORMIN (GLUCOPHAGE) 500 MG tablet, Take 500 mg by mouth daily, Disp: , Rfl:     metoprolol succinate (TOPROL XL) 25 MG extended release tablet, Take 25 mg by mouth daily, Disp: , Rfl:     nitroGLYCERIN (NITROSTAT) 0.4 MG SL tablet, Place 0.4 mg under the tongue, Disp: , Rfl:     olmesartan (BENICAR) 40 MG tablet, Take 40 mg by mouth daily, Disp: , Rfl:     oxyCODONE 5 MG capsule, Take 10 mg by mouth every 4 hours as needed. , Disp: , Rfl:     potassium gluconate 550 mg tablet, Take 550 mg by mouth daily, Disp: , Rfl:     rosuvastatin (CRESTOR) 20 MG tablet, Take 20 mg by mouth, Disp: , Rfl:     torsemide (DEMADEX) 10 MG tablet, Take by mouth as needed, Disp: , Rfl:      Allergies   Allergen Reactions    Codeine Nausea And Vomiting     And abdominal pain. Flecainide Itching    Erythromycin Nausea And Vomiting    Sulfa Antibiotics Nausea And Vomiting        Patient Active Problem List    Diagnosis Date Noted    Controlled type 2 diabetes mellitus without complication, without long-term current use of insulin ([facility] 05/27/2021     Priority: Low    Dysuria 05/27/2021     Priority: Low    Dysphagia 09/25/2019     Priority: Low    Paroxysmal atrial fibrillation ([facility] 09/25/2019     Priority: Low     1. Ad[facility] [place] with afib with RVR (9/27/19): JULIANO/DCCV. 2.  STarted on Multaq by Dr. Tamanna Wheat        Right upper quadrant pain 09/25/2019     Priority: Low    HTN (hypertension) 09/25/2019     Priority: Low    Non-cardiac chest pain 02/24/2015     Priority: Low    Coronary atherosclerosis of native coronary vessel 02/24/2015     Priority: Low     1. NSTEMI (3/31/14): Peak troponin 3.6  a. Multivessel PCI:   mRCA:  Xience 3.5 x 33 mm DEION.   mLAD:  Xience 2.75   x 18 mm DEION  2.  LHC (2/24/15):  EF 60%. Patent stents. Mild disease in other   segments. 3.  LHC (9/29/19):  EF 60-65%. Patent RCA and LAD stents. Chronic lower back pain 02/23/2015     Priority: Low    Dyslipidemia 04/02/2014     Priority: Low    Tobacco use disorder 04/02/2014     Priority: Low        Social History     Tobacco Use    Smoking status: Former     Packs/day: 1.50     Types: Cigarettes    Smokeless tobacco: Current    Tobacco comments:     Quit smoking: ecig only   Substance Use Topics    Alcohol use: No       ROS:    Review of Systems   Constitutional: Negative for malaise/fatigue. Cardiovascular:  Negative for chest pain. Respiratory:  Negative for shortness of breath. Musculoskeletal:  Positive for arthritis. Neurological:  Negative for focal weakness. Psychiatric/Behavioral:  Negative for depression. PHYSICAL EXAM:  Wt Readings from Last 3 Encounters:   No data found for Wt     BP Readings from Last 3 Encounters:   09/28/22 132/62   05/27/21 122/70     Pulse Readings from Last 3 Encounters:   09/28/22 73   05/27/21 67       Physical Exam  Constitutional:       General: She is not in acute distress. Appearance: Normal appearance. HENT:      Head: Normocephalic and atraumatic. Nose: No congestion. Mouth/Throat:      Mouth: Mucous membranes are dry. Eyes:      Extraocular Movements: Extraocular movements intact. Neck:      Vascular: No carotid bruit. Cardiovascular:      Rate and Rhythm: Normal rate and regular rhythm. Pulmonary:      Breath sounds: Normal breath sounds. No wheezing. Abdominal:      General: There is no distension. Palpations: Abdomen is soft. Musculoskeletal:         General: No swelling. Skin:     General: Skin is warm and dry. Capillary Refill: Capillary refill takes less than 2 seconds. Neurological:      General: No focal deficit present.    Psychiatric:         Mood and Affect: Mood normal.       Medical problems and test results were reviewed with the patient today. Lab Results   Component Value Date    WBC 10.0 09/26/2019    HGB 12.6 09/26/2019    HCT 38.6 09/26/2019    MCV 93.2 09/26/2019     09/26/2019       Lab Results   Component Value Date/Time     09/26/2019 03:23 AM    K 3.7 09/26/2019 03:23 AM     09/26/2019 03:23 AM    CO2 28 09/26/2019 03:23 AM    BUN 21 09/26/2019 03:23 AM    CREATININE 0.87 09/26/2019 03:23 AM    GLUCOSE 119 09/26/2019 03:23 AM    CALCIUM 8.4 09/26/2019 03:23 AM        Lab Results   Component Value Date    CHOL 180 09/26/2019     Lab Results   Component Value Date    TRIG 522 (H) 09/26/2019     Lab Results   Component Value Date    HDL 40 09/26/2019     Lab Results   Component Value Date    [address] Not calculated due to elevated triglyceride level 09/26/2019     Lab Results   Component Value Date    LABVLDL 104.4 (H) 09/26/2019     Lab Results   Component Value Date    CHOLHDLRATIO 4.5 09/26/2019        Data from outside records/labs from outside providers have been reviewed and summarized as noted in the HPI, past history and data review sections of this note     EKG done today and reviewed with patient showed:  Sinus  Rhythm   Normal axis  Normal intervals. Rate 73      ASSESSMENT and PLAN      1. Paroxysmal atrial fibrillation (HCC)  Currently in sinus rhythm. Tolerating Multaq. We have discussed A. fib ablation but she declines this as she has a great deal of discomfort in her cervical spine and back. Continue Eliquis. Check labs including CBC and CMP. - EKG 12 Lead    2. HTN (hypertension)  Blood pressure is controlled. Continue Toprol, Benicar and HCTZ. Check labs. - EKG 12 Lead    3. Atherosclerosis of native coronary artery of native heart without angina pectoris  Patient is doing well without any anginal symptoms. Continue Aspirin 81 mg a day and PRN NTG. We discussed the importance of continued risk factor modification.   The patient is encouraged to contact my office with any worsening dyspnea or chest discomfort. 4. Dyslipidemia  Continue Crestor. Check CMP and Lipids. Thor Villeda MD  9/28/2022  1:49 PM    This note may have been dictated using speech recognition software.   As a result, error of speech recognition may have occurred

## 2022-10-03 ENCOUNTER — TELEPHONE (OUTPATIENT)
Dept: CARDIOLOGY CLINIC | Age: 67
End: 2022-10-03

## 2022-10-03 RX ORDER — ICOSAPENT ETHYL 1000 MG/1
2 CAPSULE ORAL 2 TIMES DAILY
Qty: 180 CAPSULE | Refills: 3 | Status: SHIPPED | OUTPATIENT
Start: 2022-10-03

## 2022-10-03 NOTE — RESULT ENCOUNTER NOTE
Please call patient. Triglycerides very high > 500. Continue Crestor but need to add Vascepa 2 grams. BID if able to get via insurance.     Thank you

## 2022-10-03 NOTE — TELEPHONE ENCOUNTER
Patient called with results. Patient voiced understanding. Prescription sent to pharmacy[facility]  Samples put at  for patient to     Drug Samples Documentation:  Drug:Vascepa  Strength:1 gram   Quantity:4 boxes  Expiration Date:10/24  Lot Number:[id number]    Requested Prescriptions     Signed Prescriptions Disp Refills    Icosapent Ethyl (VASCEPA) 1 g CAPS capsule 180 capsule 3     Sig: Take 2 capsules by mouth 2 times daily     Authorizing Provider: Erlinda Bence     Ordering User: Cade Reyes       ----- Message from Pelon Maxwell MD sent at 10/2/2022 10:13 PM EDT -----  Please call patient. Triglycerides very high > 500. Continue Crestor but need to add Vascepa 2 grams. BID if able to get via insurance.     Thank you

## 2023-02-16 ENCOUNTER — CLINICAL DOCUMENTATION (OUTPATIENT)
Dept: CARDIOLOGY CLINIC | Age: 68
End: 2023-02-16

## 2023-02-28 ENCOUNTER — TELEPHONE (OUTPATIENT)
Dept: CARDIOLOGY CLINIC | Age: 68
End: 2023-02-28

## 2023-02-28 RX ORDER — ICOSAPENT ETHYL 1000 MG/1
2 CAPSULE ORAL 2 TIMES DAILY
Qty: 180 CAPSULE | Refills: 3 | Status: SHIPPED | OUTPATIENT
Start: 2023-02-28

## 2023-02-28 NOTE — TELEPHONE ENCOUNTER
Received faxed request from Nilda Murphy for refill on Vascepa  Spoke with patient to confirm correct pharmacy, she states that [facility]. Also needs prescription for her Eliquis. Prescriptions sent to pharmacy[facility]  Requested Prescriptions     Signed Prescriptions Disp Refills    Icosapent Ethyl (VASCEPA) 1 g CAPS capsule 180 capsule 3     Sig: Take 2 capsules by mouth 2 times daily     Authorizing Provider: Virgen Perez User: Lizet Waggoner    apixaban (ELIQUIS) 5 MG TABS tablet 180 tablet 3     Sig: Take 1 tablet by mouth in the morning and 1 tablet in the evening.      Authorizing Provider: Virgen Perez User: Lizet Waggoner

## 2023-05-09 ENCOUNTER — TELEPHONE (OUTPATIENT)
Age: 68
End: 2023-05-09

## 2023-05-09 NOTE — TELEPHONE ENCOUNTER
Cardiac Clearance        Physician or Practice Requesting:[facility]  : Tj Montero Phone Number: [phone]  Fax Number: [phone]  Date of Surgery/Procedure: 10/10/23  Type of Surgery or Procedure: C5-6,6-7 ACDF   Type of Anesthesia: general  Type of Clearance Requested: Cardiac and med  Medication to Hold:?  Days to Hold: ?

## 2023-05-10 NOTE — TELEPHONE ENCOUNTER
Low risk for surgery as no recent anginal symptoms. Okay to hold Eliquis as requested but would resume as soon as feasible to lower risk of perioperative CVA. Needs to continue aspirin 81 mg in the perioperative setting.      Note faxed//[facility]

## 2023-05-10 NOTE — TELEPHONE ENCOUNTER
Patient is on Eliquis 5 mg Need to hold 5-7 days pre and post surgery Also needs cardiac clearance.   Having C5-6,6-7 ACDF with allograft and plate with Dr. Andrew Pruitt    Last office visit 4-13-23

## 2023-08-17 RX ORDER — DRONEDARONE 400 MG/1
TABLET, FILM COATED ORAL
Qty: 180 TABLET | Refills: 3 | Status: SHIPPED | OUTPATIENT
Start: 2023-08-17

## 2023-08-17 NOTE — TELEPHONE ENCOUNTER
Prescription sent to pharmacy[facility]  Requested Prescriptions     Signed Prescriptions Disp Refills    MULTAQ 400 MG TABS 180 tablet 3     Sig: TAKE 1 TABLET BY MOUTH IN THE MORNING AND 1 TABLET IN THE EVENING WITH MEALS     Authorizing Provider: Jose De Jesus Haddad     Ordering User: Neil Head

## 2023-10-16 RX ORDER — ICOSAPENT ETHYL 1000 MG/1
2 CAPSULE ORAL 2 TIMES DAILY
Qty: 180 CAPSULE | Refills: 0 | Status: SHIPPED | OUTPATIENT
Start: 2023-10-16

## 2023-10-16 NOTE — TELEPHONE ENCOUNTER
Prescription sent to pharmacy[facility]  Requested Prescriptions     Signed Prescriptions Disp Refills    Icosapent Ethyl (VASCEPA) 1 g CAPS capsule 180 capsule 0     Sig: TAKE 2 CAPSULES BY MOUTH TWICE DAILY     Authorizing Provider: Leslie Englihs     Ordering User: Delon Payne

## 2023-11-29 RX ORDER — ICOSAPENT ETHYL 1000 MG/1
2 CAPSULE ORAL 2 TIMES DAILY
Qty: 180 CAPSULE | Refills: 0 | OUTPATIENT
Start: 2023-11-29

## 2024-02-19 ENCOUNTER — TELEPHONE (OUTPATIENT)
Age: 69
End: 2024-02-19

## 2024-02-19 ENCOUNTER — CLINICAL DOCUMENTATION (OUTPATIENT)
Age: 69
End: 2024-02-19

## 2024-02-19 NOTE — TELEPHONE ENCOUNTER
Called patient and scheduled late annual exam for 3-15-24 at 245. Will get PA for multaq. Patient will call back if she needs any more refills. Patient informed that I would send in enough until she gets in to her appointment. Patient voiced understanding and thanked me/gely

## 2024-02-19 NOTE — PROGRESS NOTES
Multaq 400MG tablets    The patient currently has access to the requested medication and a Prior Authorization is not needed for the patient/medication.

## 2024-02-22 ENCOUNTER — TELEPHONE (OUTPATIENT)
Age: 69
End: 2024-02-22

## 2024-02-22 NOTE — TELEPHONE ENCOUNTER
MEDICATION REFILL REQUEST      Name of Medication:  Multaq   Dose:  400 mg  Frequency:  twice a day   Quantity:    Days' supply:  90      Pharmacy Name/Location:  [facility] in [place] /Please call in today because she is completely out

## 2024-02-22 NOTE — TELEPHONE ENCOUNTER
Prescription sent to [facility] pharmacy//[facility]  Requested Prescriptions     Signed Prescriptions Disp Refills    dronedarone hcl (MULTAQ) 400 MG TABS 180 tablet 3     Sig: TAKE 1 TABLET BY MOUTH IN THE MORNING AND 1 TABLET IN THE EVENING WITH MEALS     Authorizing Provider: TREASURE BASS     Ordering User: STANTON PEREZ

## 2024-03-15 ENCOUNTER — OFFICE VISIT (OUTPATIENT)
Age: 69
End: 2024-03-15

## 2024-03-15 VITALS — HEART RATE: 68 BPM | SYSTOLIC BLOOD PRESSURE: 130 MMHG | HEIGHT: 62 IN | DIASTOLIC BLOOD PRESSURE: 86 MMHG

## 2024-03-15 DIAGNOSIS — I10 PRIMARY HYPERTENSION: ICD-10-CM

## 2024-03-15 DIAGNOSIS — I25.10 ATHEROSCLEROSIS OF NATIVE CORONARY ARTERY OF NATIVE HEART WITHOUT ANGINA PECTORIS: ICD-10-CM

## 2024-03-15 DIAGNOSIS — I48.0 PAROXYSMAL ATRIAL FIBRILLATION (HCC): Primary | ICD-10-CM

## 2024-03-15 DIAGNOSIS — E78.5 DYSLIPIDEMIA: ICD-10-CM

## 2024-03-15 DIAGNOSIS — E11.9 CONTROLLED TYPE 2 DIABETES MELLITUS WITHOUT COMPLICATION, WITHOUT LONG-TERM CURRENT USE OF INSULIN (HCC): ICD-10-CM

## 2024-03-15 RX ORDER — ICOSAPENT ETHYL 1000 MG/1
2 CAPSULE ORAL 2 TIMES DAILY
Qty: 360 CAPSULE | Refills: 3 | Status: SHIPPED | OUTPATIENT
Start: 2024-03-15

## 2024-03-15 ASSESSMENT — ENCOUNTER SYMPTOMS: SHORTNESS OF BREATH: 0

## 2024-03-15 NOTE — PROGRESS NOTES
work with her PCP in this regard.             Return in about 1 year (around 3/15/2025).       Robi Fierro MD  3/15/2024  3:11 PM    *Portions of the record have been created with voice recognition software. Occasional wrong-word or 'sound-a-like' substitutions may have occurred due to the inherent limitations of voice recognition software. Read the chart carefully and recognize, using context, where substitutions have occurred.

## 2024-10-14 ENCOUNTER — TELEPHONE (OUTPATIENT)
Age: 69
End: 2024-10-14

## 2024-10-14 NOTE — TELEPHONE ENCOUNTER
Patient to have steroid injection lower back (transforaminal epidural steroid injection)  Needs risk assessment and hold Eliquis  3 days prior to procedure    Last office visit 3-15-24  Last echo 9-26-19

## 2024-10-14 NOTE — TELEPHONE ENCOUNTER
If interruption of novel anticoagulation therapy is deemed necessary, the agent can be held for short period of time with a low risk of embolic events from atrial fibrillation.  Typically less than a 1% risk of CVA or embolic event.    As always,   A risk-benefit decision should be made by the physician performing procedure when deciding to hold anticoagulation.

## 2024-10-14 NOTE — TELEPHONE ENCOUNTER
Cardiac Clearance        Physician or Practice Requesting:[facility]   : Pattie   Contact Phone Number:   Fax Number: [phone]  Date of Surgery/Procedure: ?  Type of Surgery or Procedure: TFESI   Type of Anesthesia:   Type of Clearance Requested: risk assessment and any medication hold   Medication to Hold:Eliquis   Days to Hold: 3 day hold

## 2024-12-03 ENCOUNTER — TELEPHONE (OUTPATIENT)
Age: 69
End: 2024-12-03

## 2024-12-03 NOTE — TELEPHONE ENCOUNTER
Per [facility] Cardiology guidelines  for pre-procedural anticoagulation when bleeding risk is high,intermediate,or undetermined:May Hold Eliquis 24-48 hours prior to procedure.Restart ASAP post procedure.(May hold up to 96 hours only if entering epidural space,posterior chamber of eye, or intracranial space)      Copy of this note faxed to [facility]  : Pattie  Fax Number: [phone]

## 2024-12-03 NOTE — TELEPHONE ENCOUNTER
Cardiac Clearance        Physician or Practice Requesting:[facility]  : Pattie  Contact Phone Number: [phone]  Fax Number: [phone]  Date of Surgery/Procedure: 12-  Type of Surgery or Procedure: Epidural Steriod Injection  Type of Anesthesia: unknown  Type of Clearance Requested: Medication Hold Only  Medication to Hold:Eliquis  Days to Hold: 3 day prior    Received a clearance in October but this is for a different procedure. Appt is 12-/ Needs ASAP

## 2024-12-06 ENCOUNTER — TELEPHONE (OUTPATIENT)
Age: 69
End: 2024-12-06

## 2024-12-06 NOTE — TELEPHONE ENCOUNTER
Kristian Blankenship haven't received fax yet. Thank you!  Cardiac Clearance           Physician or Practice Requesting:[facility]  : Pattie  Contact Phone Number: [phone]  Fax Number: [phone]  Date of Surgery/Procedure: 12-  Type of Surgery or Procedure: Epidural Steriod Injection  Type of Anesthesia: unknown  Type of Clearance Requested: Medication Hold Only  Medication to Hold:Eliquis  Days to Hold: 3 day prior     Per Malena Clark   Per [facility] Cardiology guidelines  for pre-procedural anticoagulation when bleeding risk is high,intermediate,or undetermined:May Hold Eliquis 24-48 hours prior to procedure.Restart ASAP post procedure.(May hold up to 96 hours only if entering epidural space,posterior chamber of eye, or intracranial space)        Copy of this note faxed to [facility]  : Pattie  Fax Number: [phone]

## (undated) DEVICE — CONNECTOR TBNG OD5-7MM O2 END DISP

## (undated) DEVICE — CONTAINER PREFIL FRMLN 40ML --

## (undated) DEVICE — CANNULA NSL ORAL AD FOR CAPNOFLEX CO2 O2 AIRLFE

## (undated) DEVICE — FORCEPS BX L240CM JAW DIA2.8MM L CAP W/ NDL MIC MESH TOOTH

## (undated) DEVICE — BLOCK BITE AD 60FR W/ VELC STRP ADDRESSES MOST PT AND

## (undated) DEVICE — KENDALL RADIOLUCENT FOAM MONITORING ELECTRODE RECTANGULAR SHAPE: Brand: KENDALL